# Patient Record
Sex: FEMALE | Race: BLACK OR AFRICAN AMERICAN
[De-identification: names, ages, dates, MRNs, and addresses within clinical notes are randomized per-mention and may not be internally consistent; named-entity substitution may affect disease eponyms.]

---

## 2017-08-16 ENCOUNTER — HOSPITAL ENCOUNTER (EMERGENCY)
Dept: HOSPITAL 62 - ER | Age: 32
Discharge: HOME | End: 2017-08-16
Payer: COMMERCIAL

## 2017-08-16 VITALS — DIASTOLIC BLOOD PRESSURE: 71 MMHG | SYSTOLIC BLOOD PRESSURE: 123 MMHG

## 2017-08-16 DIAGNOSIS — M79.1: ICD-10-CM

## 2017-08-16 DIAGNOSIS — Z87.01: ICD-10-CM

## 2017-08-16 DIAGNOSIS — J06.9: Primary | ICD-10-CM

## 2017-08-16 DIAGNOSIS — H92.03: ICD-10-CM

## 2017-08-16 DIAGNOSIS — Z87.891: ICD-10-CM

## 2017-08-16 PROCEDURE — 99282 EMERGENCY DEPT VISIT SF MDM: CPT

## 2017-08-16 NOTE — ER DOCUMENT REPORT
ED ENT





- General


Chief Complaint: Ear Pain


Stated Complaint: EAR PAIN,HEADACHE


Time Seen by Provider: 08/16/17 14:05


Mode of Arrival: Ambulatory


Information source: Patient


Notes: 





32-year-old female presents to ED for complaint of bilateral ear pain runny 

nose body aches and stuffiness.  Patient states that her ears have gradually 

progressed to the point they are, they have sore for 3 days, but it got worse 

yesterday after she was out in the right.  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 

increase.


TRAVEL OUTSIDE OF THE [place] IN LAST 30 DAYS: No





- HPI


Patient complains to provider of: Ear problem, Nose problem, Other - Body aches


Onset: Other - 3 days worse in the last day


Onset/Duration: Gradual, Worse


Quality of pain: Achy, Sharp


Severity: Moderate


Pain Level: 4


Context: Recent Illness


Location of pain: Ears, Nose, Sinus


Associated symptoms: Ear pain, Runny nose, Sinus pain, Sinus drainage, Other - 

Body aches


Similar symptoms previously: Yes


Recently seen / treated by doctor: No





- Related Data


Allergies/Adverse Reactions: 


 





No Known Allergies Allergy (Verified 08/16/17 13:52)


 











Past Medical History





- General


Information source: Patient





- Social History


Smoking Status: Former Smoker


Cigarette use (# per day): No


Chew tobacco use (# tins/day): No


Smoking Education Provided: No


Frequency of alcohol use: None


Drug Abuse: None


Occupation: Call center


Lives with: Family - Her and her children


Family History: Malignancy - Skin and breast cancer


Patient has suicidal ideation: No





- Past Medical History


Cardiac Medical History: Reports: None


Pulmonary Medical History: Reports: Hx Pneumonia


EENT Medical History: Reports: None


Neurological Medical History: Reports: None


Endocrine Medical History: Reports: None


Renal/ Medical History: Reports: None


Malignancy Medical History: Reports: None


GI Medical History: Reports: None


Musculoskeltal Medical History: Reports Hx Arthritis - back, Reports Hx 

Musculoskeletal Deformity - Herniated disc, Reports Hx Musculoskeletal Trauma


Skin Medical History: Reports None


Psychiatric Medical History: Reports: Hx Depression - Patient took herself off 

of her medication


Traumatic Medical History: Reports: None


Infectious Medical History: Reports: None


Past Surgical History: Reports: Hx Oral Surgery - [place] Teeth, Hx Orthopedic 

Surgery - ACL repair R knee, L ankle





- Immunizations


Immunizations up to date: Yes


Hx Diphtheria, Pertussis, Tetanus Vaccination: Yes





Review of Systems





- Review of Systems


Constitutional: Recent illness


EENT: Ear pain, Nose discharge, Sinus pressure


Cardiovascular: No symptoms reported


Respiratory: Cough


Gastrointestinal: No symptoms reported


Genitourinary: No symptoms reported


Female Genitourinary: No symptoms reported


Musculoskeletal: Muscle pain, Other - Body aches


Skin: No symptoms reported


Hematologic/Lymphatic: No symptoms reported


Neurological/Psychological: No symptoms reported


-: Yes All other systems reviewed and negative





Physical Exam





- Vital signs


Vitals: 


 











Temp Pulse Resp BP Pulse Ox


 


 98.6 F   84   16   123/71   100 


 


 08/16/17 13:50  08/16/17 13:50  08/16/17 13:50  08/16/17 13:50  08/16/17 13:50











Interpretation: Normal





- General


General appearance: Appears well, Alert





- HEENT


Head: Normocephalic, Atraumatic


Eyes: Normal


Pupils: PERRL


Ears: Normal


External canal: Normal


Tympanic membrane: Normal


Sinus: Frontal, Mastoid, Tenderness


Nasal: Purulent discharge, Swelling


Mucous membranes: Normal


Pharynx: Normal


Neck: Normal





- Respiratory


Respiratory status: No respiratory distress


Chest status: Nontender


Breath sounds: Normal


Chest palpation: Normal





- Cardiovascular


Rhythm: Regular


Heart sounds: Normal auscultation


Murmur: No





- Abdominal


Inspection: Normal


Distension: No distension


Bowel sounds: Normal


Tenderness: Nontender


Organomegaly: No organomegaly





- Back


Back: Normal, Nontender





- Extremities


General upper extremity: Normal inspection, Nontender, Normal color, Normal ROM

, Normal temperature


General lower extremity: Normal inspection, Nontender, Normal color, Normal ROM

, Normal temperature, Normal weight bearing.  No: Jane's sign





- Neurological


Neuro grossly intact: Yes


Cognition: Normal


Orientation: AAOx4


[place] Coma Scale Eye Opening: Spontaneous


[place] Coma Scale Verbal: Oriented


[place] Coma Scale Motor: Obeys Commands


[place] Coma Scale Total: 15


Speech: Normal


Motor strength normal: LUE, RUE, LLE, RLE


Sensory: Normal





- Psychological


Associated symptoms: Normal affect, Normal mood





- Skin


Skin Temperature: Warm


Skin Moisture: Dry


Skin Color: Normal





Course





- Re-evaluation


Re-evalutation: 





08/16/17 14:38


Assessment consistent with an upper respiratory infection with sinus pressure 

and pain.  Afebrile having body aches ear pain ears are normal looking.





- Vital Signs


Vital signs: 


 











Temp Pulse Resp BP Pulse Ox


 


 98.6 F   84   16   123/71   100 


 


 08/16/17 13:50  08/16/17 13:50  08/16/17 13:50  08/16/17 13:50  08/16/17 13:50














Discharge





- Discharge


Clinical Impression: 


 Otalgia of both ears





URI (upper respiratory infection)


Qualifiers:


 URI type: unspecified URI Qualified Code(s): J06.9 - Acute upper respiratory 

infection, unspecified





Condition: Stable


Disposition: HOME, SELF-CARE


Instructions:  Family Physicians / Practices, Use of Over-The-Counter Ibuprofen 

([facility])


Additional Instructions: 


UPPER RESPIRATORY ILLNESS:





     You have a viral infection of the respiratory passages -- a "cold."  This 

common infection causes nasal congestion, drainage, and often sore throat and 

cough.  It is highly contagious.  The disease usually lasts about 10 to 14 days.


     There is no "cure" for the viral infection -- it must run its course.  If 

there is a complication, such as bacterial infection in the nose, sinuses, 

middle ear, or bronchial tubes, antibiotics may be required.  The antibiotics 

won't affect the virus.


     Drink plenty of fluids.  A humidifier may help.  An expectorant medication 

or decongestant may make you more comfortable.  Use acetaminophen or ibuprofen 

for fever or aches.


     See the doctor if fever persists over two days, if there is any 

significant worsening of your symptoms, or if you simply fail to improve as 

expected.








DECONGESTANT MEDICATION:


     A decongestant medicine has been recommended.  Often this medicine is 

combined in the same tablet with an antihistamine or expectorant. This type of 

medicine is helpful in treating a bad cold or sinus condition, as well as in 

treatment of the nasal congestion of hay fever.  It is not of much benefit for 

lung infections.


     Decongestant medicines are related to stimulants.  They can cause an 

increase in blood pressure and heart rate.  Persons with heart disease and high 

blood pressure should not take decongestants without discussing this with the 

physician.


     If you develop palpitations, chest pain, headache, or tremors, stop the 

medicine and consult your physician.








COUGH-SUPPRESSANT & EXPECTORANT MEDICATION:


     You are to use a cough medication as needed for relief of symptoms.  This 

medicine is a combination of an expectorant (to make the mucous thinner and 

more easily "coughed up") and a cough suppressant (to reduce the frequency of 

coughing).


     The cough-suppressant medicine is related to narcotics.  You may 

experience mild nausea and sleepiness.  Some patients who are very sensitive to 

narcotics may have stomach pain from this medicine. Taking the medicine with 

food reduces these side effects.  Do not drive or work with machinery until you 

know how this medicine affects you.


     The expectorant should have no side effects.  Iodine-containing 

expectorants (such as organidin) should not be taken by persons with active 

thyroid disease unless approved by your doctor.


     Call the doctor if you develop shortness of breath, hives, rash, itching, 

lightheadedness, or severe nausea and vomiting.











USE OF ACETAMINOPHEN (Tylenol):


     Acetaminophen may be taken for pain relief or fever control. It's much 

safer than aspirin, offering a wider range of "safe" dosages.  It is safe 

during pregnancy.  Some brand names are Tylenol, Panadol, Datril, Anacin 3, 

Tempra, and Liquiprin. Acetaminophen can be repeated every four hours.  The 

following are maximum recommended dosages:


>89 pounds or adults          650 mg to 900 mg


Acetaminophen can be repeated every four hours.  Maximum dose not to exceed 

4000 mg a day.





FOLLOW-UP CARE:


If you have been referred to a physician for follow-up care, call the physician

s office for an appointment as you were instructed or within the next two days.

  If you experience worsening or a significant change in your symptoms, notify 

the physician immediately or return to the Emergency Department at any time for 

re-evaluation.





Forms:  Return to Work


Referrals: 


MAX GOLDSTEIN MD [Primary Care Provider] - Follow up as needed

## 2017-10-26 ENCOUNTER — HOSPITAL ENCOUNTER (EMERGENCY)
Dept: HOSPITAL 62 - ER | Age: 32
Discharge: HOME | End: 2017-10-26
Payer: COMMERCIAL

## 2017-10-26 VITALS — SYSTOLIC BLOOD PRESSURE: 122 MMHG | DIASTOLIC BLOOD PRESSURE: 78 MMHG

## 2017-10-26 DIAGNOSIS — K64.4: ICD-10-CM

## 2017-10-26 DIAGNOSIS — M54.42: Primary | ICD-10-CM

## 2017-10-26 DIAGNOSIS — Z87.891: ICD-10-CM

## 2017-10-26 DIAGNOSIS — G89.29: ICD-10-CM

## 2017-10-26 DIAGNOSIS — K64.8: ICD-10-CM

## 2017-10-26 PROCEDURE — 96372 THER/PROPH/DIAG INJ SC/IM: CPT

## 2017-10-26 PROCEDURE — 73502 X-RAY EXAM HIP UNI 2-3 VIEWS: CPT

## 2017-10-26 PROCEDURE — 99283 EMERGENCY DEPT VISIT LOW MDM: CPT

## 2017-10-26 NOTE — RADIOLOGY REPORT (SQ)
EXAM DESCRIPTION:  HIP LEFT AP/LATERAL



COMPLETED DATE/TIME:  10/26/2017 2:36 pm



REASON FOR STUDY:  pain radiating down to foot



COMPARISON:  None.



NUMBER OF VIEWS:  Two views.



TECHNIQUE:  AP pelvis and additional frog-leg view of the left hip.



LIMITATIONS:  None.



FINDINGS:  MINERALIZATION: Normal.

LEFT HIP: No fracture or dislocation.  No worrisome bone lesions.

RIGHT HIP: No fracture or dislocation.  No worrisome bone lesions.

PUBIS AND ISCHIUM: No fracture.

PELVIS: No fracture.

SACRUM: No fracture or dislocation. No worrisome bone lesions.

LOWER LUMBAR SPINE: No fracture or dislocation. No worrisome bone lesions.  No significant disc disea
se.

SOFT TISSUES: No findings.

OTHER: IUD is projected in the mid pelvis.



IMPRESSION:  NEGATIVE STUDY OF THE LEFT HIP AND PELVIS. NO RADIOGRAPHIC EVIDENCE OF ACUTE INJURY.



TECHNICAL DOCUMENTATION:  JOB ID:  [id number]

 2011 [employer]- All Rights Reserved

## 2017-10-26 NOTE — ER DOCUMENT REPORT
ED Extremity Problem, Lower





- General


Chief Complaint: Leg Pain


Stated Complaint: LEG PAIN


Time Seen by Provider: 10/26/17 13:01


Mode of Arrival: Ambulatory


Information source: Patient


Notes: 





32-year-old female presents to ED for complaint of low back pain radiating to 

her left leg and foot.  She states she seen her PCP last week and was given 

Robaxin and Vicoprofen with no relief.  She states she also has a cyst are not 

beside her rectum that is getting worse and more painful.  She states she has 

had hemorrhoids in the past and this feels like a hemorrhoid.


TRAVEL OUTSIDE OF THE [place] IN LAST 30 DAYS: No





- HPI


Patient complains to provider of: Pain.  No: Injury


Location: Back, Buttock, Hip


Occurred: Other - Back pain radiating to the leg is actually chronic hemorrhoid 

is been for several days


Onset/Duration: Worse


Quality of pain: Pressure, Sharp


Severity: Severe


Pain Level: 5


Recent injury: No


Associated symptoms: Painful ambulation


Exacerbated by: Movement, Walking, Other - Sitting


Relieved by: Nothing





- Related Data


Allergies/Adverse Reactions: 


 





No Known Allergies Allergy (Verified 10/26/17 12:18)


 











Past Medical History





- General


Information source: Patient





- Social History


Smoking Status: Former Smoker


Cigarette use (# per day): No


Chew tobacco use (# tins/day): No


Smoking Education Provided: No


Frequency of alcohol use: Rare


Drug Abuse: None


Lives with: Family


Family History: Malignancy - Skin and breast cancer


Patient has suicidal ideation: No


Patient has homicidal ideation: No





- Past Medical History


Cardiac Medical History: Reports: None


Pulmonary Medical History: Reports: Hx Pneumonia


EENT Medical History: Reports: None


Neurological Medical History: Reports: None


Endocrine Medical History: Reports: None


Renal/ Medical History: Reports: None


Malignancy Medical History: Reports: None


GI Medical History: Reports: None


Musculoskeltal Medical History: Reports Hx Arthritis - back, Reports Hx 

Musculoskeletal Deformity - Herniated disc, Reports Hx Musculoskeletal Trauma


Skin Medical History: Reports None


Psychiatric Medical History: Reports: Hx Depression - Patient took herself off 

of her medication


Traumatic Medical History: Reports: None


Infectious Medical History: Reports: None


Past Surgical History: Reports: Hx Oral Surgery - [place] Teeth, Hx Orthopedic 

Surgery - ACL repair R knee, L ankle





- Immunizations


Immunizations up to date: Yes


Hx Diphtheria, Pertussis, Tetanus Vaccination: Yes





Review of Systems





- Review of Systems


Constitutional: No symptoms reported


EENT: No symptoms reported


Cardiovascular: No symptoms reported


Respiratory: No symptoms reported


Gastrointestinal: No symptoms reported.  denies: Constipation, Fecal 

incontinence


Genitourinary: No symptoms reported.  denies: Incontinence, Retention


Female Genitourinary: No symptoms reported


Musculoskeletal: Back pain, Muscle pain


Skin: No symptoms reported


Hematologic/Lymphatic: Other - Hemorrhoid


Neurological/Psychological: No symptoms reported


-: Yes All other systems reviewed and negative





Physical Exam





- Vital signs


Vitals: 


 











Temp Pulse Resp BP Pulse Ox


 


 99.4 F   90   16   139/84 H  100 


 


 10/26/17 12:19  10/26/17 12:19  10/26/17 12:19  10/26/17 12:19  10/26/17 12:19











Interpretation: Normal





- General


General appearance: Appears well, Alert





- HEENT


Head: Normocephalic, Atraumatic


Eyes: Normal


Pupils: PERRL





- Respiratory


Respiratory status: No respiratory distress


Chest status: Nontender


Breath sounds: Normal


Chest palpation: Normal





- Cardiovascular


Rhythm: Regular


Heart sounds: Normal auscultation


Murmur: No





- Abdominal


Inspection: Normal


Distension: No distension


Bowel sounds: Normal


Tenderness: Nontender


Organomegaly: No organomegaly





- Rectal


Tenderness: Yes


Hemorrhoids: Internal, External


Notes: 





3 total hemorrhaged 1 external to internal, will start patient on Anusol.





- Back


Back: Normal, Tender.  No: Deformity/step-off, CVA tenderness, Vertebra 

tenderness, Scars, Scoliosis, Wounds





- Extremities


General upper extremity: Normal inspection, Nontender, Normal color, Normal ROM

, Normal temperature


General lower extremity: Normal inspection, Nontender, Normal color, Normal ROM

, Normal temperature, Normal weight bearing.  No: Jane's sign





- Neurological


Neuro grossly intact: Yes


Cognition: Normal


Orientation: AAOx4


[place] Coma Scale Eye Opening: Spontaneous


[place] Coma Scale Verbal: Oriented


[place] Coma Scale Motor: Obeys Commands


[place] Coma Scale Total: 15


Speech: Normal


Cranial nerves: Normal


Cerebellar coordination: Normal


Motor strength normal: LUE, RUE, LLE, RLE


Additional motor exam normals: Equal 


Babinski reflex: Normal (flexor plantar)


Sensory: Normal





- Psychological


Associated symptoms: Normal affect, Normal mood





- Skin


Skin Temperature: Warm


Skin Moisture: Dry


Skin Color: Normal





Course





- Re-evaluation


Re-evalutation: 





10/26/17 15:21


Patient denies any signs or symptoms of cauda equina, no saddle anesthesia, no 

loss of sensation, no loss of control of bowel bladder, no loss of muscle 

control, and has not fallen or injured herself.  Patient has a history of 

chronic back pain with sciatica.  She has been to see a back specialist in the 

past states she has not been in a while.  She was seen her by her PCP last week 

and given Vicoprofen and Robaxin.  She states she has not fallen.  She does 

have hemorrhoids which she will be treated with suppositories for.  I have 

changed her muscle relaxer from Robaxin to Flexeril.  Patient instructed to 

follow-up with her primary doctor and her back specialist for her continued 

pain.  I explained to the patient the policy for the emergency room for chronic 

pain with no new injuries.





- Vital Signs


Vital signs: 


 











Temp Pulse Resp BP Pulse Ox


 


 99.4 F   89   16   122/78   98 


 


 10/26/17 12:19  10/26/17 15:18  10/26/17 15:18  10/26/17 15:18  10/26/17 15:18














- Diagnostic Test


Radiology reviewed: Image reviewed, Reports reviewed





Discharge





- Discharge


Clinical Impression: 


 Acute hemorrhoid





Low back pain


Qualifiers:


 Chronicity: unspecified Back pain laterality: left Sciatica presence: with 

sciatica Sciatica laterality: sciatica of left side Qualified Code(s): M54.42 - 

Lumbago with sciatica, left side





Condition: Stable


Disposition: HOME, SELF-CARE


Additional Instructions: 


LOW BACK PAIN:





     Three out of every four people will have an episode of disabling back pain 

during their lifetime.  Most commonly the pain is due to straining of the 

muscles and ligaments in the low back.


     Usual treatment includes: 


(1) Rest on a firm surface.  Avoid lying on your stomach.  


(2) Ice pack the painful area.  After a few days, gentle heat may be used 

intermittently to relax the area, or ice packs can be continued.  


(3) Medication may be needed -- muscle relaxers and antiinflammatory medicines 

are commonly used.  


(4) As the back improves, exercises are prescribed to strengthen the back and 

abdominal muscles.


     Your doctor will advise you on the proper care for your back at each stage 

in your recovery.  You may be better in a few days -- or healing may take 

several weeks.


     If new symptoms of a "herniated disc" (radiation of pain, numbness, or 

tingling down the back of the leg or weakness in the leg) occur, you should be 

re-examined.  Further testing may be necessary.





Sciatica





     Your symptoms suggest "sciatica."  The pain of sciatica typically radiates 

down the leg.  Numbness in the foot or calf may also occur. Sciatica is caused 

by irritation of the sciatic nerve or its branches. The irritation can be due 

to a herniated disk in the spine, swelling and inflammation in the muscles 

surrounding the sciatic nerve, or direct injury of the nerve itself.


     Most cases of sciatica will resolve with medical treatment. Bed rest is 

usually recommended initially.  Surgery is only necessary when the condition 

will not improve with rest and antiinflammatory medication.  Muscle relaxers 

are often given if muscle soreness is present.


     A CAT scan of the back may be performed if a herniated disk is suspected.


     Re-examination is necessary if you develop increasing numbness, localized 

weakness in the foot or ankle, or if the pain does not respond to rest.





Hemorrhoids





     You have hemorrhoids.  These are formed by enlargement of veins around the 

anus.  The cause is increased pressure in the veins, from pregnancy or 

straining at bowel movements.  Hemorrhoids often cause itching and bleeding 

with bowel movements.  When a hemorrhoid becomes clotted, severe pain and 

swelling result.


     Soothing creams and suppositories are often prescribed.  Warm sitz-baths 

may also decrease pain, swelling, and itching.  Eat a high-fiber diet.  Stool 

softeners such as Metamucil will help.


     Keep the area very clean.  Medicated cleansing pads (such as Tucks) are 

useful after bowel movements.  A hose-mounted shower unit (like a shower 

massager at low water pressure) can be used to clean around tender hemorrhoid 

tags.


     You should call the doctor or return if you develop fever, increasing pain

, or an enlarging mass around the anus, or if you simply fail to improve with 

treatment.





Toradol Injection





     You have been given an injection of ketorolac tromethamine (Toradol).  

This is an excellent, safe drug for pain control.  It also has potent 

antiinflammatory action.  You should have significant pain relief within about 

one hour.


     Toradol is not addicting and is non-sedating.  It does not interfere with 

driving or work.


     Call or return if you develop itching, hives, shortness of breath, or rash.





STEROID MEDICATION:





     You have been given an injection of medicine of the cortisone/steroid 

class.  This medication is used to control inflammation or allergy.  It is 

often continued as a pill for a short period of time, until the acute process 

subsides.


     There are usually no side effects from short-term use of cortisone-like 

medications.  Some persons feel an increased sense of well-being and are not 

sleepy at bedtime.  Long-term use of cortisone medications is best avoided, 

unless required for a severe condition.  If your condition does not remit, or 

relapses after the course of corticosteroid medication, you should consult your 

physician.








MUSCLE RELAXERS: 


     Muscle relaxing medications are usually prescribed for acute muscle spasm 

or injury to the neck and back.  They are often combined with antiinflammatory 

pain medication for increased relief.


     You may stop the muscle relaxer when the pain and stiffness have improved.

  Start the medication again if spasms recur.  


     Muscle relaxers may cause drowsiness, especially with the first dose.  Do 

not operate machinery or drive while under the effects of the medication.  Most 

muscle relaxers last up to 24 hours.  Do not combine the medication with 

alcohol.








ICE PACKS:


     Apply ice packs frequently against the painful area.  Many different 

schedules are recommended, such as "20 minutes on, 20 minutes off" or "one hour 

ice, two hours rest."  If you need to work, you may need to go longer between 

ice treatments.  You should plan to have the area ice packed AT LEAST one 

fourth of the time.


     The ice should be applied over the wrap, tape, or splint, or over a layer 

of cloth -- not directly against the skin.  Some ice bags have a built-in cloth 

and can be put directly on the skin.





WARM PACKS:


     After approximately two days, apply gentle heat (such as a heating pad or 

hot water bottle) for about 20 to 30 minutes about every two hours -- at least 

four times daily.  Warmth and elevation will help you make a more rapid recovery

, and will ease the pain considerably.


     Do not use HOT heat, and never apply heat for longer than 30 minutes.  The 

continuous heat can invisibly damage skin and muscles -- even when no burn is 

seen on the surface.  Damaged muscles can make you MORE sore.








FOLLOW-UP CARE:


If you have been referred to a physician for follow-up care, call the physician

s office for an appointment as you were instructed or within the next two days.

  If you experience worsening or a significant change in your symptoms, notify 

the physician immediately or return to the Emergency Department at any time for 

re-evaluation.


Prescriptions: 


Hydrocortisone Acetate 25 mg RC Q6HP PRN #10 supp.rect


 PRN Reason: 


Cyclobenzaprine HCl [Flexeril 10 mg Tablet] 10 mg PO TIDP PRN #15 tab


 PRN Reason: 


Forms:  Elevated Blood Pressure, Return to Work


Referrals: 


CAROL JO MD [ASSOCIATE] - Follow up as needed


DEAN MENDEZ MD [ACTIVE STAFF] - Follow up as needed

## 2017-10-27 ENCOUNTER — HOSPITAL ENCOUNTER (OUTPATIENT)
Dept: HOSPITAL 62 - RAD | Age: 32
End: 2017-10-27
Attending: INTERNAL MEDICINE
Payer: COMMERCIAL

## 2017-10-27 DIAGNOSIS — M54.16: Primary | ICD-10-CM

## 2017-10-27 PROCEDURE — 72158 MRI LUMBAR SPINE W/O & W/DYE: CPT

## 2017-10-27 PROCEDURE — A9577 INJ MULTIHANCE: HCPCS

## 2017-10-27 NOTE — RADIOLOGY REPORT (SQ)
EXAM DESCRIPTION:  MRI LUMBAR SPINE COMBO



COMPLETED DATE/TIME:  10/27/2017 6:21 pm



REASON FOR STUDY:  Radiculopathy, lumbar region M54.16  RADICULOPATHY, LUMBAR REGION



COMPARISON:  Correlation made to radiographs from 5/4/2016



TECHNIQUE:  Sagittal and Axial imaging includes T1, T1 post gadolinium, T2, STIR and gradient echo se
quences. Coronal T2/HASTE imaging.



CONTRAST TYPE AND DOSE:  20 mL mL Multihance.



RENAL FUNCTION:  None required. The patient is less than 50 years old.



LIMITATIONS:  None.



FINDINGS:  VISUALIZED UPPER ABDOMEN:  Limited evaluation. No acute or suspicious findings suggested.

SEGMENTATION: No transitional anatomy. The lowest well-developed disc space is labeled L5-S1.

ALIGNMENT: Anatomic.  Stable degree of levoscoliosis.

VERTEBRAE: Intact.  No fractures.

BONE MARROW: Benign intraosseous hemangioma within the L3 vertebral body.  Marrow signal otherwise no
rmal without fracture or suspicious osseous lesion.

DISC SIGNAL: Disc desiccation L4-L5 and L5-S1.

POSTERIOR ELEMENTS:  Status post L4 laminectomy.  Otherwise intact.  No pars defect evident.

HARDWARE: None in the spine.

CORD AND CONUS: Normal in size and signal intensity. Conus at the appropriate level.

SOFT TISSUES: No aortic aneurysm seen. No bulky retroperitoneal adenopathy or mass. No paraspinal mas
s or fluid.

L1-L2: No significant spinal stenosis or exit foraminal stenosis.

L2-L3: No significant spinal stenosis or exit foraminal stenosis.

L3-L4: Mild broad-based disc bulging without significant spinal canal stenosis or neural foraminal na
rrowing.

L4-L5: Broad-based disc bulging eccentric to the left which results in mild neural foraminal narrowin
g and probably contacts the transiting L5 nerve root within the subarticular recess.  No significant 
spinal canal stenosis or right neural foraminal narrowing.

L5-S1: Broad-based disc bulging with small superimposed central protrusion along with ligamentum flav
um hypertrophy and facet arthropathy which results in mild bilateral neural foraminal narrowing and m
ild spinal canal stenosis.  No definite nerve root impingement.

LOWER THORACIC: Incompletely imaged.  No stenosis seen.

SACRUM: Visualized upper sacrum intact.

ENHANCEMENT: No abnormal enhancement.

OTHER: No other significant findings.



IMPRESSION:  DEGENERATIVE CHANGE MOST NOTABLE AT L4-L5 AND L5-S1 WITH PROBABLE CONTACT OF THE LEFT L5
 NERVE ROOT WITHIN THE SUBARTICULAR RECESS.  CORRELATE WITH RADICULOPATHY AT THIS LEVEL.

NO HIGH-GRADE SPINAL CANAL STENOSIS IDENTIFIED.



TECHNICAL DOCUMENTATION:  JOB ID:  [id number]

 2011 [employer]- All Rights Reserved

## 2017-12-23 ENCOUNTER — HOSPITAL ENCOUNTER (OUTPATIENT)
Dept: HOSPITAL 62 - LAB | Age: 32
End: 2017-12-23
Attending: INTERNAL MEDICINE
Payer: COMMERCIAL

## 2017-12-23 DIAGNOSIS — Z01.812: Primary | ICD-10-CM

## 2017-12-23 LAB
ANION GAP SERPL CALC-SCNC: 7 MMOL/L (ref 5–19)
APTT BLD: 30.9 SEC (ref 23.5–35.8)
BASOPHILS # BLD AUTO: 0.1 10^3/UL (ref 0–0.2)
BASOPHILS NFR BLD AUTO: 0.5 % (ref 0–2)
BUN SERPL-MCNC: 10 MG/DL (ref 7–20)
CALCIUM: 8.8 MG/DL (ref 8.4–10.2)
CHLORIDE SERPL-SCNC: 104 MMOL/L (ref 98–107)
CO2 SERPL-SCNC: 29 MMOL/L (ref 22–30)
CREAT SERPL-MCNC: 0.73 MG/DL (ref 0.52–1.25)
EOSINOPHIL # BLD AUTO: 0.3 10^3/UL (ref 0–0.6)
EOSINOPHIL NFR BLD AUTO: 3.1 % (ref 0–6)
ERYTHROCYTE [DISTWIDTH] IN BLOOD BY AUTOMATED COUNT: 14.5 % (ref 11.5–14)
GLUCOSE SERPL-MCNC: 88 MG/DL (ref 75–110)
HCT VFR BLD CALC: 39.7 % (ref 36–47)
HGB BLD-MCNC: 13.1 G/DL (ref 12–15.5)
HGB HCT DIFFERENCE: -0.4
LYMPHOCYTES # BLD AUTO: 2.8 10^3/UL (ref 0.5–4.7)
LYMPHOCYTES NFR BLD AUTO: 27.9 % (ref 13–45)
MCH RBC QN AUTO: 27.1 PG (ref 27–33.4)
MCHC RBC AUTO-ENTMCNC: 33 G/DL (ref 32–36)
MCV RBC AUTO: 82 FL (ref 80–97)
MONOCYTES # BLD AUTO: 0.6 10^3/UL (ref 0.1–1.4)
MONOCYTES NFR BLD AUTO: 6.4 % (ref 3–13)
NEUTROPHILS # BLD AUTO: 6.2 10^3/UL (ref 1.7–8.2)
NEUTS SEG NFR BLD AUTO: 62.1 % (ref 42–78)
POTASSIUM SERPL-SCNC: 4.2 MMOL/L (ref 3.6–5)
PROTHROMBIN TIME: 12.7 SEC (ref 11.4–15.4)
RBC # BLD AUTO: 4.84 10^6/UL (ref 3.72–5.28)
SODIUM SERPL-SCNC: 139.6 MMOL/L (ref 137–145)
WBC # BLD AUTO: 10 10^3/UL (ref 4–10.5)

## 2017-12-23 PROCEDURE — 80048 BASIC METABOLIC PNL TOTAL CA: CPT

## 2017-12-23 PROCEDURE — 85730 THROMBOPLASTIN TIME PARTIAL: CPT

## 2017-12-23 PROCEDURE — 36415 COLL VENOUS BLD VENIPUNCTURE: CPT

## 2017-12-23 PROCEDURE — 85025 COMPLETE CBC W/AUTO DIFF WBC: CPT

## 2017-12-23 PROCEDURE — 85610 PROTHROMBIN TIME: CPT

## 2018-03-01 ENCOUNTER — HOSPITAL ENCOUNTER (EMERGENCY)
Dept: HOSPITAL 62 - ER | Age: 33
Discharge: HOME | End: 2018-03-01
Payer: MEDICAID

## 2018-03-01 VITALS — SYSTOLIC BLOOD PRESSURE: 125 MMHG | DIASTOLIC BLOOD PRESSURE: 69 MMHG

## 2018-03-01 DIAGNOSIS — J02.8: Primary | ICD-10-CM

## 2018-03-01 DIAGNOSIS — B97.89: ICD-10-CM

## 2018-03-01 DIAGNOSIS — R51: ICD-10-CM

## 2018-03-01 DIAGNOSIS — M54.5: ICD-10-CM

## 2018-03-01 DIAGNOSIS — H92.09: ICD-10-CM

## 2018-03-01 LAB
A TYPE INFLUENZA AG: NEGATIVE
B INFLUENZA AG: NEGATIVE

## 2018-03-01 PROCEDURE — 99283 EMERGENCY DEPT VISIT LOW MDM: CPT

## 2018-03-01 PROCEDURE — 87804 INFLUENZA ASSAY W/OPTIC: CPT

## 2018-03-01 PROCEDURE — 87880 STREP A ASSAY W/OPTIC: CPT

## 2018-03-01 PROCEDURE — 87077 CULTURE AEROBIC IDENTIFY: CPT

## 2018-03-01 PROCEDURE — 87070 CULTURE OTHR SPECIMN AEROBIC: CPT

## 2018-03-01 NOTE — ER DOCUMENT REPORT
ED General





- General


Chief Complaint: Sore Throat


Stated Complaint: BODY ACHES, EAR/THROAT PAIN


Time Seen by Provider: 03/01/18 09:20


Mode of Arrival: Ambulatory


Information source: Patient


Notes: 





Patient is a 31 yo FM who presents with 2 day history of sore throat, ear pain, 

body aches, headache. She does endorse fever last night (Tmax 101.6F) and took 

tylenol cold and flu and went to sleep. Denies any fever this morning, neck pain

/stiffness, cough, abdominal pain, vomiting. diarrhea. She has not gotten the 

flu shot and denies sick contacts. 


TRAVEL OUTSIDE OF THE [place] IN LAST 30 DAYS: No





- Related Data


Allergies/Adverse Reactions: 


 





No Known Allergies Allergy (Verified 03/01/18 08:22)


 











Past Medical History





- General


Information source: Patient





- Social History


Smoking Status: Never Smoker


Frequency of alcohol use: None


Drug Abuse: None


Family History: Malignancy - Skin and breast cancer


Patient has suicidal ideation: No


Patient has homicidal ideation: No





- Past Medical History


Cardiac Medical History: 


   Denies: Hx Coronary Artery Disease, Hx Heart Attack, Hx Hypertension


Pulmonary Medical History: Reports: Hx Pneumonia


   Denies: Hx Asthma, Hx Bronchitis, Hx COPD


Neurological Medical History: Denies: Hx Cerebrovascular Accident, Hx Seizures


Renal/ Medical History: Denies: Hx Peritoneal Dialysis


Musculoskeltal Medical History: Reports Hx Arthritis - back, Reports Hx 

Musculoskeletal Deformity - Herniated disc, Reports Hx Musculoskeletal Trauma


Psychiatric Medical History: Reports: Hx Depression - Patient took herself off 

of her medication


Past Surgical History: Reports: Hx Oral Surgery - [place] Teeth, Hx Orthopedic 

Surgery - ACL repair R knee, L ankle





- Immunizations


Immunizations up to date: Yes


Hx Diphtheria, Pertussis, Tetanus Vaccination: Yes





Review of Systems





- Review of Systems


Constitutional: See HPI


EENT: See HPI


Cardiovascular: No symptoms reported


Respiratory: See HPI


Gastrointestinal: No symptoms reported


Genitourinary: No symptoms reported


Female Genitourinary: No symptoms reported


Musculoskeletal: No symptoms reported


Skin: No symptoms reported


Hematologic/Lymphatic: No symptoms reported


Neurological/Psychological: No symptoms reported





Physical Exam





- Vital signs


Vitals: 


 











Temp Pulse Resp BP Pulse Ox


 


 98.5 F   97   16   131/73 H  98 


 


 03/01/18 08:25  03/01/18 08:25  03/01/18 08:25  03/01/18 08:25  03/01/18 08:25














- Notes


Notes: 





PHYSICAL EXAM:


CONSTITUTIONAL: Alert and oriented, well-appearing and in no acute distress. 


HENT: Normocephalic, atraumatic. Ear canals  erythematous b/l without  foreign 

body, TMs  dull with good bony landmarks. Nares clear without erythema, septal 

hematoma or deviation, airway patent. Oropharynx erythematous without tonsilar 

exudate or malocclusion. Trachea midline. Uvula midline. Moist mucous membranes.


EYES: Pupils equal round and reactive to light, EOM intact. Sclera anicteric, 

conjunctiva are normal. No entrapment. 


NECK: supple without lymphadenopathy. No midline tenderness or paraspinous 

muscle spasms. No step-offs or deformities. ROM intact. 


HEART: Regular rate and rhythm without murmurs. 


LUNGS: CTAB and equal. No wheezes, rales or rhonchi. 


GI: Normactive bowel sounds. Nontender, non-distended. No organomegaly. no 

CVAT. 


EXTREMITIES: no bony tenderness, erythema, edema, ecchymosis or deformity. 

Normal range of motion, no pitting edema. No cyanosis. Cap Refill <3 seconds.


NEURO: Cranial nerves grossly intact. Normal sensory/motor exams.


PSYCH: Normal mood, normal affect. 


SKIN: Warm and dry. Normal turgor. No rashes or lesions noted.





Course





- Re-evaluation


Re-evalutation: 





03/01/18 09:28


Patient seen and examined. VSS, well hydrated, well appearing, non-toxic. 

Speaking in full sentences without difficulty. Based on exam, low suspicion for 

peritonsillar abscess, meningitis, sepsis or other emergent condition. Will 

obtain flu/strep swab. 








03/01/18 10:29


Rapid flu and strep negative, throat culture pending. Consistent with viral 

pharyngitis/URI. Will give supportive treatments and work note.  At this time, 

will discharge with return precautions and follow-up recommendations. Verbal 

discharge instructions given at the bedside and opportunity for questions 

given. Medication warnings reviewed. Patient is in agreement with this plan and 

has verbalized understanding of return precautions and the need for primary 

care follow-up in the next 24-72 hours. 








- Vital Signs


Vital signs: 


 











Temp Pulse Resp BP Pulse Ox


 


 98.7 F   85   16   125/69   100 


 


 03/01/18 10:25  03/01/18 10:25  03/01/18 10:25  03/01/18 10:25  03/01/18 10:25














Discharge





- Discharge


Clinical Impression: 


 Viral pharyngitis, Viral URI





Low back pain


Qualifiers:


 Chronicity: acute Back pain laterality: bilateral Sciatica presence: without 

sciatica Qualified Code(s): M54.5 - Low back pain





Condition: Stable


Disposition: HOME, SELF-CARE


Additional Instructions: 


UPPER RESPIRATORY ILLNESS:





     You have a viral infection of the respiratory passages -- a "cold."  This 

common infection causes nasal congestion, drainage, and often sore throat and 

cough.  It is highly contagious.  The disease usually lasts about 10 to 14 days.


     There is no "cure" for the viral infection -- it must run its course.  If 

there is a complication, such as bacterial infection in the nose, sinuses, 

middle ear, or bronchial tubes, antibiotics may be required.  The antibiotics 

won't affect the virus.


     Drink plenty of fluids.  A humidifier may help.  An expectorant medication 

or decongestant may make you more comfortable.  Use acetaminophen or ibuprofen 

for fever or aches.


     See the doctor if fever persists over two days, if there is any 

significant worsening of your symptoms, or if you simply fail to improve as 

expected.








BRONCHOSPASM:





     You have tightness in the bronchial tubes, called bronchospasm. This often 

occurs with bronchial infections.  Allergies, inhaled chemicals, and polluted 

or cold air can also provoke bronchospasm. It's more likely in patients with 

asthma in the family.


     Emergency treatment of bronchospasm may include adrenaline shots or 

bronchodilator aerosol.  You may feel lightheaded and have a rapid pulse for an 

hour or two.  Rest and get plenty of fluids.


     At home, we'll treat you with a bronchodilator inhaler. Antibiotics and 

corticosteroids may be required for some patients. Until you recover, avoid 

chemical fumes, dusts, pollens, and exercising in very cold or dry air. If you 

smoke, stop now!!


     If you develop a fever, increased wheezing, chest pain, or severe 

shortness of breath, you should contact the doctor immediately.








DECONGESTANT MEDICATION:


     A decongestant medicine has been prescribed.  Often this medicine is 

combined in the same tablet with an antihistamine or expectorant. This type of 

medicine is helpful in treating a bad cold or sinus condition, as well as in 

treatment of the nasal congestion of hay fever.  It is not of much benefit for 

lung infections.


     Decongestant medicines are related to stimulants.  They can cause an 

increase in blood pressure and heart rate.  Persons with heart disease and high 

blood pressure should not take decongestants without discussing this with the 

physician.


     If you develop palpitations, chest pain, headache, or tremors, stop the 

medicine and consult your physician.








COUGH-SUPPRESSANT & EXPECTORANT MEDICATION:


     You are to use a cough medication as needed for relief of symptoms.  This 

medicine is a combination of an expectorant (to make the mucous thinner and 

more easily "coughed up") and a cough suppressant (to reduce the frequency of 

coughing).


     The cough-suppressant medicine is related to narcotics.  You may 

experience mild nausea and sleepiness.  Some patients who are very sensitive to 

narcotics may have stomach pain from this medicine. Taking the medicine with 

food reduces these side effects.  Do not drive or work with machinery until you 

know how this medicine affects you.


     The expectorant should have no side effects.  Iodine-containing 

expectorants (such as organidin) should not be taken by persons with active 

thyroid disease unless approved by your doctor.


     Call the doctor if you develop shortness of breath, hives, rash, itching, 

lightheadedness, or severe nausea and vomiting.








USE OF ACETAMINOPHEN (Tylenol):


     Acetaminophen may be taken for pain relief or fever control. It's much 

safer than aspirin, offering a wider range of "safe" dosages.  It is safe 

during pregnancy.  Some brand names are Tylenol, Panadol, Datril, Anacin 3, 

Tempra, and Liquiprin. Acetaminophen can be repeated every four hours.  The 

following are maximum recommended dosages:


>89 pounds or adults          650 mg to 900 mg


Acetaminophen can be repeated every four hours.  Maximum dose not to exceed 

4000 mg a day.








SMOKING:


     If you smoke, you should stop smoking.  The tar and chemicals in cigarette 

smoke are harmful.  Smoking has been shown to cause:


          emphysema


          chronic bronchitis


          lung cancer


          mouth and throat cancer


          stomach and pancreas cancer


          premature aging


          birth defects


     In addition, smoking increases ear and lung infections in children of 

smokers.








FOLLOW-UP CARE:


If you have been referred to a physician for follow-up care, call the physician

s office for an appointment as you were instructed or within the next two days.

  If you experience worsening or a significant change in your symptoms, notify 

the physician immediately or return to the Emergency Department at any time for 

re-evaluation.





Prescriptions: 


Pseudoephedrine HCl [Sudafed] 30 mg PO Q6HP PRN #8 tablet


 PRN Reason: 


Benzonatate [Tessalon Perles 100 mg Capsule] 100 mg PO Q8HP PRN #20 capsule


 PRN Reason: 


Albuterol Sulfate [Proair HFA Inhalation Aerosol 8.5 gm MDI] 2 puff IH Q4H PRN #

1 mdi


 PRN Reason: 


Naproxen [Naprosyn 250 mg Tablet] 500 mg PO BID #20 tablet


Forms:  Elevated Blood Pressure, Return to Work


Referrals: 


DEAN MENDEZ MD [Primary Care Provider] - Follow up in 3-5 days

## 2018-04-25 ENCOUNTER — HOSPITAL ENCOUNTER (EMERGENCY)
Dept: HOSPITAL 62 - ER | Age: 33
Discharge: HOME | End: 2018-04-25
Payer: COMMERCIAL

## 2018-04-25 VITALS — DIASTOLIC BLOOD PRESSURE: 82 MMHG | SYSTOLIC BLOOD PRESSURE: 132 MMHG

## 2018-04-25 DIAGNOSIS — Z98.890: ICD-10-CM

## 2018-04-25 DIAGNOSIS — M54.42: Primary | ICD-10-CM

## 2018-04-25 PROCEDURE — 99283 EMERGENCY DEPT VISIT LOW MDM: CPT

## 2018-04-25 PROCEDURE — 96372 THER/PROPH/DIAG INJ SC/IM: CPT

## 2018-04-25 NOTE — ER DOCUMENT REPORT
ED Neck/Back Problem





- General


Chief Complaint: Back Pain


Stated Complaint: LOW BACK PAIN


Time Seen by Provider: 04/25/18 13:38


Notes: 





Patient is a healthy 32-year-old female complaining of low back pain radiating 

into left buttock and left posterior thigh 3 days.  Patient does have a 

history of disc herniation with surgery performed this past December.  Patient 

has recently been on crutches due to a foot fracture.  Patient denies any fever

, paresthesia, bowel or bladder incontinence.


TRAVEL OUTSIDE OF THE [place] IN LAST 30 DAYS: No





- HPI


Onset: Sudden


Timing: Still present


Quality of pain: Achy, Burning


Associated symptoms: None


Exacerbated by: Movement of trunk, Sitting position


Relieved by: Nothing


Similar symptoms previously: Yes


Recently seen / treated by doctor: No





- Related Data


Allergies/Adverse Reactions: 


 





No Known Allergies Allergy (Verified 04/25/18 12:12)


 











Past Medical History





- General


Information source: Patient





- Social History


Smoking Status: Never Smoker


Chew tobacco use (# tins/day): No


Frequency of alcohol use: None


Drug Abuse: None


Lives with: Family


Family History: Malignancy - Skin and breast cancer


Patient has suicidal ideation: No


Patient has homicidal ideation: No





- Past Medical History


Cardiac Medical History: 


   Denies: Hx Coronary Artery Disease, Hx Heart Attack, Hx Hypertension


Pulmonary Medical History: Reports: Hx Pneumonia


   Denies: Hx Asthma, Hx Bronchitis, Hx COPD


Neurological Medical History: Denies: Hx Cerebrovascular Accident, Hx Seizures


Renal/ Medical History: Denies: Hx Peritoneal Dialysis


Musculoskeltal Medical History: Reports Hx Arthritis - back, Reports Hx 

Musculoskeletal Deformity - Herniated disc, Reports Hx Musculoskeletal Trauma


Psychiatric Medical History: Reports: Hx Depression - Patient took herself off 

of her medication


Past Surgical History: Reports: Hx Oral Surgery - [place] Teeth, Hx Orthopedic 

Surgery - ACL repair R knee, L ankle, 2 back





- Immunizations


Immunizations up to date: Yes


Hx Diphtheria, Pertussis, Tetanus Vaccination: Yes





Review of Systems





- Review of Systems


Constitutional: No symptoms reported


EENT: No symptoms reported


Cardiovascular: No symptoms reported


Respiratory: No symptoms reported


Gastrointestinal: No symptoms reported


Genitourinary: No symptoms reported


Female Genitourinary: No symptoms reported


Musculoskeletal: See HPI


Skin: No symptoms reported


Hematologic/Lymphatic: No symptoms reported


Neurological/Psychological: No symptoms reported





Physical Exam





- Vital signs


Vitals: 


 











Temp Pulse Resp BP Pulse Ox


 


 99.0 F   84   17   131/71 H  99 


 


 04/25/18 12:13  04/25/18 12:13  04/25/18 12:13  04/25/18 12:13  04/25/18 12:13











Interpretation: Normal





- General


General appearance: Appears well, Alert





- HEENT


Head: Normocephalic, Atraumatic


Eyes: Normal


Pupils: PERRL





- Respiratory


Respiratory status: No respiratory distress


Chest status: Nontender


Breath sounds: Normal


Chest palpation: Normal





- Cardiovascular


Rhythm: Regular


Heart sounds: Normal auscultation


Murmur: No





- Abdominal


Inspection: Normal


Distension: No distension


Bowel sounds: Normal


Tenderness: Nontender


Organomegaly: No organomegaly





- Back


Back: Vertebra tenderness - Mild lumbar vertebral tenderness.  Focal left SI 

tenderness negative straight leg test, Wounds - Healed vertical lumbar scar





- Extremities


General upper extremity: Normal inspection, Nontender, Normal color, Normal ROM

, Normal temperature


General lower extremity: Normal inspection, Nontender, Normal color, Normal ROM

, Normal temperature, Normal weight bearing.  No: Jane's sign





- Neurological


Neuro grossly intact: Yes


Cognition: Normal


Orientation: AAOx4


[place] Coma Scale Eye Opening: Spontaneous


[place] Coma Scale Verbal: Oriented


[place] Coma Scale Motor: Obeys Commands


[place] Coma Scale Total: 15


Speech: Normal


Motor strength normal: LUE, RUE, LLE, RLE


Sensory: Normal





- Psychological


Associated symptoms: Normal affect, Normal mood





- Skin


Skin Temperature: Warm


Skin Moisture: Dry


Skin Color: Normal





Course





- Re-evaluation


Re-evalutation: 





04/25/18 14:39


Patient presents with acute low back pain with left sciatica.  No signs of cord 

compression, cauda equina, infection aneurysm or other serious etiology.  

Patient is neurologically intact, independently and steadily ambulatory without 

paresthesia or neurologic deficits.  No further testing or imaging is indicated 

at this time.  Home care, follow-up with primary care and ED return precautions 

discussed with patient.  Patient verbalized understanding, is agreeable with 

plan and stable for discharge.  Short course of pain medication, muscle 

relaxant and anti-inflammatories were prescribed.


04/25/18 14:45


[place] controlled substance database reviewed.  No recent narcotic 

prescriptions written.





- Vital Signs


Vital signs: 


 











Temp Pulse Resp BP Pulse Ox


 


 99.0 F   84   17   131/71 H  99 


 


 04/25/18 12:13  04/25/18 12:13  04/25/18 12:13  04/25/18 12:13  04/25/18 12:13














Discharge





- Discharge


Clinical Impression: 


Low back pain


Qualifiers:


 Chronicity: acute Back pain laterality: left Sciatica presence: with sciatica 

Sciatica laterality: sciatica of left side Qualified Code(s): M54.42 - Lumbago 

with sciatica, left side





Condition: Stable


Disposition: HOME, SELF-CARE


Instructions:  Ice Packs ([facility]), Low Back Pain ([facility]), Oral Narcotic Medication (

[facility]), Toradol Injection ([facility]), Steroid Medication


Additional Instructions: 


Medications as prescribed


ice to area


follow up with your primary care for further evaluation and treatment


Prescriptions: 


Ibuprofen [Motrin 800 Mg Tablet] 800 mg PO Q6H #20 tablet


Methocarbamol [Robaxin 500 Mg Tablet] 1,000 mg PO Q6 #30 tablet


Oxycodone HCl/Acetaminophen [Percocet 5-325 mg Tablet] 1 - 2 tab PO ASDIR PRN #

15 tablet


 PRN Reason: 


Forms:  Return to Work


Referrals: 


DEAN MENDEZ MD [Primary Care Provider] - Follow up as needed

## 2018-07-11 ENCOUNTER — HOSPITAL ENCOUNTER (EMERGENCY)
Dept: HOSPITAL 62 - ER | Age: 33
Discharge: HOME | End: 2018-07-11
Payer: COMMERCIAL

## 2018-07-11 VITALS — DIASTOLIC BLOOD PRESSURE: 72 MMHG | SYSTOLIC BLOOD PRESSURE: 125 MMHG

## 2018-07-11 DIAGNOSIS — M54.41: Primary | ICD-10-CM

## 2018-07-11 PROCEDURE — 72110 X-RAY EXAM L-2 SPINE 4/>VWS: CPT

## 2018-07-11 PROCEDURE — 96372 THER/PROPH/DIAG INJ SC/IM: CPT

## 2018-07-11 PROCEDURE — 99283 EMERGENCY DEPT VISIT LOW MDM: CPT

## 2018-07-11 NOTE — ER DOCUMENT REPORT
HPI





- HPI


Patient complains to provider of: Low back pain


Onset: Other - 3 days


Onset/Duration: Persistent


Quality of pain: Sharp


Pain Level: 4


Context: 





She has a history of chronic low back pain and states that the pain increased 3 

days ago.  Patient states she felt a crunching sensation in her lower back.  

Patient does have an appointment with her orthopedic surgeon next week.  

Patient denies any fever.  Patient does report pain that radiates into 

bilateral lower extremities.


Associated Symptoms: Other - Low back pain.  denies: Fever, Nausea, Vomiting


Exacerbated by: Movement, Walking


Relieved by: Denies


Similar symptoms previously: Yes


Recently seen / treated by doctor: No





- ROS


ROS below otherwise negative: Yes


Systems Reviewed and Negative: Yes All other systems reviewed and negative





- CONSTITUTIONAL


Constitutional: DENIES: Fever, Chills





- NEURO


Neurology: DENIES: Weakness





- GASTROINTESTINAL


Gastrointestinal: DENIES: Nausea





- URINARY


Urinary: DENIES: Dysuria





- REPRODUCTIVE


Reproductive: DENIES: Pregnant:





- MUSCULOSKELETAL


Musculoskeletal: REPORTS: Extremity pain, Back Pain





- DERM


Skin Color: Normal


Skin Problems: None





Past Medical History





- General


Information source: Patient





- Social History


Smoking Status: Never Smoker


Frequency of alcohol use: None


Drug Abuse: None


Occupation: Call center


Lives with: Family


Family History: Malignancy - Skin and breast cancer





- Past Medical History


Cardiac Medical History: 


   Denies: Hx Coronary Artery Disease, Hx Heart Attack, Hx Hypertension


Pulmonary Medical History: Reports: Hx Pneumonia


   Denies: Hx Asthma, Hx Bronchitis, Hx COPD


Neurological Medical History: Denies: Hx Cerebrovascular Accident, Hx Seizures


Renal/ Medical History: Denies: Hx Peritoneal Dialysis


Musculoskeltal Medical History: Reports Hx Arthritis - back, Reports Hx 

Musculoskeletal Deformity - Herniated disc, Reports Hx Musculoskeletal Trauma


Psychiatric Medical History: Reports: Hx Depression - Patient took herself off 

of her medication


Past Surgical History: Reports: Hx Oral Surgery - [place] Teeth, Hx Orthopedic 

Surgery - ACL repair R knee, L ankle, 2 back





- Immunizations


Immunizations up to date: Yes


Hx Diphtheria, Pertussis, Tetanus Vaccination: Yes





Vertical Provider Document





- CONSTITUTIONAL


Agree With Documented VS: Yes


Exam Limitations: No Limitations


General Appearance: WD/WN, No Apparent Distress


Notes: 





PHYSICAL EXAMINATION:





GENERAL: Well-appearing, well-nourished and in no acute distress.





HEAD: Atraumatic, normocephalic.





EYES: sclera clear, anicteric, conjunctiva are normal.





ENT: nares patent, Moist mucous membranes.





NECK: Normal range of motion, supple no lymphadenopathy





LUNGS: respirations unlabored





HEART: Regular rate and rhythm without murmurs 





EXTREMITIES: Normal range of motion, no pitting or edema.  No cyanosis. Gait 

normal, pt ambulates without difficulty





BACK: Left lower lumbar paraspinal tenderness midline tenderness, no 

deformities or step-offs.  No CVA tenderness. 





NEUROLOGICAL: Cranial nerves grossly intact.  Normal speech, normal gait.  No 

saddle anesthesia.  No foot drop.  Negative straight leg test bilaterally





PSYCH: Normal mood, normal affect.





SKIN: Warm, Dry, normal turgor, no rashes or lesions noted.











- INFECTION CONTROL


TRAVEL OUTSIDE OF THE [place] IN LAST 30 DAYS: No





Course





- Re-evaluation


Re-evalutation: 





07/11/18 14:13


The patient presents with low back pain without signs of spinal cord compression

, cauda equina syndrome, infection, aneurysm, or other serious etiology.  The 

patient is neurologically intact.  Given the extremely risk of these diagnoses 

further testing and evaluation for these possibilities does not appear to be 

indicated at this time.  Patient has been instructed to return if the symptoms 

worsen or change in any way.





- Vital Signs


Vital signs: 


 











Temp Pulse Resp BP Pulse Ox


 


 98.6 F   80   18   120/67   100 


 


 07/11/18 13:21  07/11/18 13:21  07/11/18 13:21  07/11/18 13:21  07/11/18 13:21














- Diagnostic Test


Radiology reviewed: Reports reviewed





Discharge





- Discharge


Clinical Impression: 


Low back pain


Qualifiers:


 Chronicity: chronic Back pain laterality: left Sciatica presence: with 

sciatica Sciatica laterality: bilateral sciatica Qualified Code(s): M54.41 - 

Lumbago with sciatica, right side





Condition: Stable


Disposition: HOME, SELF-CARE


Instructions:  Ice Packs ([facility]), Low Back Pain ([facility]), Oral Narcotic Medication (

[facility])


Additional Instructions: 


Return immediately for any new or worsening symptoms





Followup with your primary care provider, call tomorrow to make a followup 

appointment








Prescriptions: 


Naproxen [Naprosyn 250 Nmg Tablet] 1 tab PO BID #14 tablet


Oxycodone HCl/Acetaminophen [Percocet 5-325 mg Tablet] 1 - 2 tab PO ASDIR PRN #

15 tablet


 PRN Reason: 


Forms:  Return to Work


Referrals: 


DEAN MENDEZ MD [Primary Care Provider] - Follow up as needed

## 2018-07-11 NOTE — RADIOLOGY REPORT (SQ)
EXAM DESCRIPTION:  L SPINE WHOLE



COMPLETED DATE/TIME:  7/11/2018 2:01 pm



REASON FOR STUDY:  low back pain, felt crunch sensation



COMPARISON:  MRI lumbar spine 10/27/2017

Lumbar spine films 5/4/2016



NUMBER OF VIEWS:  Five views including obliques.



TECHNIQUE:  AP, lateral, oblique, and sacral radiographic images acquired of the lumbar spine.



LIMITATIONS:  None.



FINDINGS:  MINERALIZATION: Normal.

SEGMENTATION: Normal.  No transitional anatomy.

ALIGNMENT: Mild convex leftward lumbar curvature, stable

VERTEBRAE: Maintained height.  No fracture or worrisome bone lesion.

DISCS: Preserved height.  No significant osteophytes or end plate irregularity.

POSTERIOR ELEMENTS: Old laminectomy at L4-5.  Bilateral facet arthropathy at L3-4, L4-5, and L5-S1

HARDWARE: None in the spine.

PARASPINAL SOFT TISSUES: Normal.

PELVIS: Not included in the field of view.  SI joints are unremarkable

OTHER: IUD in the uterus



IMPRESSION:  A laminectomy at the L4-5 level.  Convex leftward lumbar curvature with multilevel facet
 arthropathy.  No acute findings



TECHNICAL DOCUMENTATION:  JOB ID:  [id number]

 [employer]erved- All Rights Reserved



Reading location - IP/workstation name: [email or web address]

## 2018-10-25 ENCOUNTER — HOSPITAL ENCOUNTER (EMERGENCY)
Dept: HOSPITAL 62 - ER | Age: 33
Discharge: HOME | End: 2018-10-25
Payer: COMMERCIAL

## 2018-10-25 VITALS — SYSTOLIC BLOOD PRESSURE: 115 MMHG | DIASTOLIC BLOOD PRESSURE: 62 MMHG

## 2018-10-25 DIAGNOSIS — M19.90: ICD-10-CM

## 2018-10-25 DIAGNOSIS — A60.00: Primary | ICD-10-CM

## 2018-10-25 LAB
APPEARANCE UR: (no result)
APTT PPP: YELLOW S
BILIRUB UR QL STRIP: NEGATIVE
GLUCOSE UR STRIP-MCNC: NEGATIVE MG/DL
KETONES UR STRIP-MCNC: NEGATIVE MG/DL
NITRITE UR QL STRIP: NEGATIVE
PH UR STRIP: 6 [PH] (ref 5–9)
PROT UR STRIP-MCNC: NEGATIVE MG/DL
SP GR UR STRIP: 1.02
UROBILINOGEN UR-MCNC: 2 MG/DL (ref ?–2)

## 2018-10-25 PROCEDURE — 99283 EMERGENCY DEPT VISIT LOW MDM: CPT

## 2018-10-25 PROCEDURE — 81025 URINE PREGNANCY TEST: CPT

## 2018-10-25 PROCEDURE — 81001 URINALYSIS AUTO W/SCOPE: CPT

## 2018-10-25 NOTE — ER DOCUMENT REPORT
ED General





- General


Chief Complaint: Vaginal Pain


Stated Complaint: VAGINAL PAIN,SWELLING, BODY ACHES


Time Seen by Provider: 10/25/18 12:36


TRAVEL OUTSIDE OF THE [place] IN LAST 30 DAYS: No





- HPI


Notes: 





Patient is a 33-year-old female that presents to the emergency department for 

chief complaint of vaginal lesions.


Patient has history of herpes.  She states this morning she woke up with 

painful blisters on her left labia.  They have not drained anything.  She 

reports swelling in her left groin and left labial region.  She states it is a 

severe sharp pain.  She states it feels similar to previous herpes outbreaks in 

the past.  She does not take any medicine for herpes daily and states she has 

not had an outbreak in the last few years.  She denies any concern for 

pregnancy.  She denies fevers or chills.  She does report some malaise 

yesterday but had improvement of that symptom today.





Past Medical History: Negative





Past Surgical History: Back surgery





Social History: Denies drugs alcohol and tobacco





Family History: Reviewed and noncontributory for presenting illness





Allergies: Reviewed, see documented allergy list.








REVIEW OF SYSTEMS:





CONSTITUTIONAL : 





No fever





No chills





No diaphoresis





No recent illness





EENT:





No vision changes





No congestion





No sore throat  





CARDIOVASCULAR:





No chest pain





No palpitations





RESPIRATORY:





No shortness of breath





No cough





No difficulty breathing





GASTROINTESTINAL: 





No abdominal pain





No nausea





No vomiting





No diarrhea





GENITOURINARY:





No dysuria





No hematuria





No difficulty urinating





Vaginal lesion





MUSCULOSKELETAL:





No back pain





No leg pain





No arm pain





SKIN:  





No rashes





No lesions





LYMPHATIC: 





No swollen, enlarged glands.





NEUROLOGICAL: 





No lightheadedness





No headache





No weakness





No paresthesias





PSYCHIATRIC:





No anxiety





No depression 








PHYSICAL EXAMINATION:





Vital signs reviewed, nursing noted reviewed.





GENERAL: Well-appearing, well-nourished and in no acute distress.





HEAD: Atraumatic, normocephalic.





EYES: Eyes appear normal, extraocular movements intact, sclera anicteric, 

conjunctiva are normal.





ENT: nares patent, oropharynx clear without exudates.  Moist mucous membranes.





NECK: Normal range of motion, supple without lymphadenopathy





LUNGS: Breath sounds clear to auscultation bilaterally and equal.  No wheezes 

rales or rhonchi.





HEART: Regular rate and rhythm without murmurs





ABDOMEN: Soft, nontender, normoactive bowel sounds.  No rebound, guarding, or 

rigidity. No masses appreciated.





: Left labial swelling with vesicular lesions, no drainage, no areas of 

induration or erythema.  Left inguinal lymphadenopathy





EXTREMITIES: Nontender, good range of motion, no pitting or edema. 





NEUROLOGICAL: No focal neurological deficits. Moves all extremities 

spontaneously Motor and sensory grossly intact on exam.





PSYCH: Normal mood, normal affect.





SKIN: Warm, Dry, normal turgor, no rashes or lesions noted on exposed skin











- Related Data


Allergies/Adverse Reactions: 


 





No Known Allergies Allergy (Verified 07/11/18 13:05)


 











Past Medical History





- Social History


Smoking Status: Never Smoker


Frequency of alcohol use: None


Drug Abuse: None


Family History: Malignancy - Skin and breast cancer


Patient has suicidal ideation: No


Patient has homicidal ideation: No





- Past Medical History


Cardiac Medical History: 


   Denies: Hx Coronary Artery Disease, Hx Heart Attack, Hx Hypertension


Pulmonary Medical History: Reports: Hx Pneumonia


   Denies: Hx Asthma, Hx Bronchitis, Hx COPD


Neurological Medical History: Denies: Hx Cerebrovascular Accident, Hx Seizures


Renal/ Medical History: Denies: Hx Peritoneal Dialysis


Musculoskeletal Medical History: Reports Hx Arthritis - back, Reports Hx 

Musculoskeletal Deformity - Herniated disc, Reports Hx Musculoskeletal Trauma


Psychiatric Medical History: Reports: Hx Depression - Patient took herself off 

of her medication


Past Surgical History: Reports: Hx Oral Surgery - [place] Teeth, Hx Orthopedic 

Surgery - ACL repair R knee, L ankle, 2 back





- Immunizations


Immunizations up to date: Yes


Hx Diphtheria, Pertussis, Tetanus Vaccination: Yes





Review of Systems





- Review of Systems


Notes: 





Dictated





Physical Exam





- Vital signs


Vitals: 





 











Temp Pulse Resp BP Pulse Ox


 


 98.7 F   71   16   115/62   100 


 


 10/25/18 12:24  10/25/18 12:24  10/25/18 12:24  10/25/18 12:24  10/25/18 12:24














- Notes


Notes: 





Dictated





Course





- Re-evaluation


Re-evalutation: 





10/25/18 14:19


Vitals reviewed.  Nursing notes reviewed.  Patient's lesions are consistent 

with herpes.  She will be started on  acyclovir and referred to GYN for follow-

up.  Discharged home in stable condition.  She was counseled on safe sex 

practices.  All questions answered.





- Vital Signs


Vital signs: 





 











Temp Pulse Resp BP Pulse Ox


 


 98.7 F   71   16   115/62   100 


 


 10/25/18 12:24  10/25/18 12:24  10/25/18 12:24  10/25/18 12:24  10/25/18 12:24














- Laboratory


Laboratory results interpreted by me: 





 











  10/25/18





  12:50


 


Urine Urobilinogen  2.0 H














Discharge





- Discharge


Clinical Impression: 


 Herpes genitalis in women





Condition: Stable


Disposition: HOME, SELF-CARE


Instructions:  Genital Herpes ([facility]), Acyclovir ([facility])


Additional Instructions: 


Please return to the emergency department if you have any worsening, or concern 

of your symptoms.





Please return to the emergency department if you develop chest pain, difficulty 

breathing, severe abdominal pain, or ongoing vomiting.





Please follow-up with your primary care physician in 2-3 days and any other 

recommended physicians.





If prescribed, take all medications as directed. 





If you have any questions or concerns do not hesitate to return the emergency 

department for evaluation.





[]





Prescriptions: 


Acyclovir [Acyclovir 400 mg Tablet] 400 mg PO TID 5 Days  tablet


Referrals: 


DEAN MENDEZ MD [Primary Care Provider] - Follow up as needed


[facility] [Provider Group] - Follow up as needed

## 2018-10-25 NOTE — ER DOCUMENT REPORT
ED Medical Screen (RME)





- General


Chief Complaint: Vaginal Pain


Stated Complaint: VAGINAL PAIN,SWELLING, BODY ACHES


Time Seen by Provider: 10/25/18 12:36


TRAVEL OUTSIDE OF THE [place] IN LAST 30 DAYS: No





- HPI


Notes: 





10/25/18 12:45


Sexually active with boyfriend coming in for vaginal sores history of herpes 

now any medications.  States slight discharge





- Related Data


Allergies/Adverse Reactions: 


 





No Known Allergies Allergy (Verified 07/11/18 13:05)


 











Past Medical History





- Social History


Frequency of alcohol use: None


Drug Abuse: None





- Past Medical History


Cardiac Medical History: 


   Denies: Hx Coronary Artery Disease, Hx Heart Attack, Hx Hypertension


Pulmonary Medical History: Reports: Hx Pneumonia


   Denies: Hx Asthma, Hx Bronchitis, Hx COPD


Neurological Medical History: Denies: Hx Cerebrovascular Accident, Hx Seizures


Renal/ Medical History: Denies: Hx Peritoneal Dialysis


Musculoskeltal Medical History: Reports Hx Arthritis - back, Reports Hx 

Musculoskeletal Deformity - Herniated disc, Reports Hx Musculoskeletal Trauma


Psychiatric Medical History: Reports: Hx Depression - Patient took herself off 

of her medication


Past Surgical History: Reports: Hx Oral Surgery - [place] Teeth, Hx Orthopedic 

Surgery - ACL repair R knee, L ankle, 2 back





- Immunizations


Immunizations up to date: Yes


Hx Diphtheria, Pertussis, Tetanus Vaccination: Yes





Review of Systems





- Review of Systems


Female Genitourinary: Vaginal discharge





Physical Exam





- Vital signs


Vitals: 





 











Temp Pulse Resp BP Pulse Ox


 


 98.7 F   71   16   115/62   100 


 


 10/25/18 12:24  10/25/18 12:24  10/25/18 12:24  10/25/18 12:24  10/25/18 12:24














- Respiratory


Respiratory status: No respiratory distress


Chest status: Nontender


Breath sounds: Normal


Chest palpation: Normal





- Cardiovascular


Rhythm: Regular


Heart sounds: Normal auscultation





Course





- Vital Signs


Vital signs: 





 











Temp Pulse Resp BP Pulse Ox


 


 98.7 F   71   16   115/62   100 


 


 10/25/18 12:24  10/25/18 12:24  10/25/18 12:24  10/25/18 12:24  10/25/18 12:24














Doctor's Discharge





- Discharge


Referrals: 


DEAN MENDEZ MD [Primary Care Provider] - Follow up as needed

## 2018-11-15 ENCOUNTER — HOSPITAL ENCOUNTER (EMERGENCY)
Dept: HOSPITAL 62 - ER | Age: 33
Discharge: HOME | End: 2018-11-15
Payer: COMMERCIAL

## 2018-11-15 VITALS — DIASTOLIC BLOOD PRESSURE: 75 MMHG | SYSTOLIC BLOOD PRESSURE: 142 MMHG

## 2018-11-15 DIAGNOSIS — G89.29: Primary | ICD-10-CM

## 2018-11-15 DIAGNOSIS — M54.5: ICD-10-CM

## 2018-11-15 PROCEDURE — 99283 EMERGENCY DEPT VISIT LOW MDM: CPT

## 2018-11-15 PROCEDURE — 96372 THER/PROPH/DIAG INJ SC/IM: CPT

## 2018-11-15 NOTE — ER DOCUMENT REPORT
HPI





- HPI


Time Seen by Provider: 11/15/18 15:20


Pain Level: 4


Notes: 





Patient is a 33-year-old female with a history of chronic back pain status post 

2 previous surgeries who presents to the ED complaining of acute on chronic 

flareup of her back pain.  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 

she started feeling the pain in her back about 4-5 days ago.  Patient states 

that the pain will occasionally radiate down her left lower extremity because 

tingling sensation.  She is scheduled to see her neurosurgeon in about 2 weeks.

  She is otherwise eating and drinking without difficulty.  She is urinating 

normally and having normal bowel movements.  She has not had any vaginal 

discharge, odor, or bleeding.  No history of spinal abscess, IV drug abuse, or 

recent injection/procedures.  Denies any headache, fever, URI, sore throat, 

chest pain, palpitations, syncope, cough, shortness of breath, wheeze, dyspnea, 

abdominal pain, nausea/vomiting/diarrhea, urinary retention, dysuria, hematuria

, loss of control of bowel or bladder, numbness, saddle anesthesia, muscle 

paralysis/weakness, or rash.





- ROS


Systems Reviewed and Negative: Yes All other systems reviewed and negative





- REPRODUCTIVE


Reproductive: DENIES: Pregnant:





Past Medical History





- Social History


Smoking Status: Unknown if Ever Smoked


Family History: Malignancy - Skin and breast cancer





- Past Medical History


Cardiac Medical History: 


   Denies: Hx Coronary Artery Disease, Hx Heart Attack, Hx Hypertension


Pulmonary Medical History: Reports: Hx Pneumonia


   Denies: Hx Asthma, Hx Bronchitis, Hx COPD


Neurological Medical History: Denies: Hx Cerebrovascular Accident, Hx Seizures


Renal/ Medical History: Denies: Hx Peritoneal Dialysis


Musculoskeletal Medical History: Reports Hx Arthritis - back, Reports Hx 

Musculoskeletal Deformity - Herniated disc, Reports Hx Musculoskeletal Trauma


Psychiatric Medical History: Reports: Hx Depression - Patient took herself off 

of her medication


Past Surgical History: Reports: Hx Oral Surgery - [place] Teeth, Hx Orthopedic 

Surgery - ACL repair R knee, L ankle, 2 back





- Immunizations


Immunizations up to date: Yes


Hx Diphtheria, Pertussis, Tetanus Vaccination: Yes





Vertical Provider Document





- CONSTITUTIONAL


Agree With Documented VS: Yes


Notes: 





PHYSICAL EXAMINATION:





GENERAL: Well-appearing, well-nourished and in no acute distress. 





LUNGS: Breath sounds clear to auscultation bilaterally and equal.  No wheezes 

rales or rhonchi.





HEART: Regular rate and rhythm without murmurs, rubs, gallops.





ABDOMEN: Soft, nontender, nondistended abdomen.  No guarding, no rebound.  No 

masses appreciated.  Normal bowel sounds present.  No CVA tenderness 

bilaterally.  No pulsatile mass





Musculoskeletal: LE's b/l:  FROM to passive/active. Strength 5+/5.  No deficits 

noted.  No bony tenderness of extremities.





Back:  FROM to passive/active.  Strength 5+/5.  No vertebral point tenderness, 

stepoffs, or deformities.  No other bony tenderness, erythema, swelling, or 

ecchymosis.  SLR negative b/l.  + mild tenderness to the L-paraspinal mm left 

side.  Mild spasming.  + mild left SI jt tenderness.  No foot drop





Extremities:  No cyanosis, clubbing, or edema b/l.  Peripheral pulses 2+.  

Capillary refill less than 2 seconds.





NEUROLOGICAL: Normal speech, normal gait.  Normal sensory, motor exams.  

Reflexes 2+ b/l.  





PSYCH: Normal mood, normal affect.





SKIN: Warm, Dry, normal turgor, no rashes or lesions noted.





- INFECTION CONTROL


TRAVEL OUTSIDE OF THE [place] IN LAST 30 DAYS: No





Course





- Re-evaluation


Re-evalutation: 





11/15/18 15:28


Patient is an afebrile, well-hydrated, 33-year-old female who presents to the 

ED with acute on chronic low back pain.  Vitals are acceptable.  PE is 

otherwise unremarkable for any focal neurological deficits.  Patient was given 

Decadron, Toradol, and Lidoderm patch. She has no significant tachycardia, 

tachypnea, or hypoxia.  She is nontoxic-appearing and is tolerating p.o. 

without difficulties.  There are no signs of infection.  No other red flag 

symptoms noted.  No other labs or imaging warranted at this time based on H&P.  

Low suspicion for any meningitis, fracture, expanding/ruptured AAA, cauda 

equina syndrome, epidural mass lesion/abscess, herniated disc causing severe 

spinal stenosis, or other systemic infection at this time.  Patient is aware 

that his condition can change from initial presentation and that she needs 

monitor symptoms closely for any acute changes.  I will send him home with a 

prescription for baclofen and naproxen.  Conservative measures otherwise for 

symptoms.  Recheck with your PCM in 3-5 days.  Consider consult with orthopedic/

physical therapy.  Return to the ED with any worsening/concerning symptoms 

otherwise as reviewed discharge.  Patient is in agreement.





- Vital Signs


Vital signs: 


 











Temp Pulse Resp BP Pulse Ox


 


 98.8 F   70   18   142/75 H  98 


 


 11/15/18 13:28  11/15/18 13:28  11/15/18 13:28  11/15/18 13:28  11/15/18 13:28














Discharge





- Discharge


Clinical Impression: 


Low back pain


Qualifiers:


 Chronicity: acute Back pain laterality: left Sciatica presence: unspecified 

whether sciatica present Qualified Code(s): M54.5 - Low back pain





Condition: Stable


Disposition: HOME, SELF-CARE


Instructions:  Low Back Pain ([facility])


Additional Instructions: 


Rest, Ice, Compression, Elevation


Tylenol/ibuprofen as needed


Light stretches daily


Strength exercises as able


Moist heat and massage may help


F/u with your PCP/Neurosurgery in 3-5 days for a recheck


Consider consult(s) with Orthopedics/physical therapy for ongoing/worsening 

symptoms





Return to the ED with any worsening symptoms and/or development of fever, 

headache, chest pain, palpitations, syncope, shortness of breath, trouble 

breathing, abdominal pain, n/v/d, blood in stool/urine, loss of control of bowel

/bladder, urinary retention, muscle weakness/paralysis, saddle anesthesia, 

numbness/tingling, or other worsening symptoms that are concerning to you.


Prescriptions: 


Baclofen [Baclofen 10 mg Tablet] 5 - 10 mg PO BID PRN #10 tablet


 PRN Reason: 


Naproxen 500 mg PO BID #20 tablet


Forms:  Elevated Blood Pressure


Referrals: 


DEAN MENDEZ MD [Primary Care Provider] - Follow up in 3-5 days

## 2019-01-16 ENCOUNTER — HOSPITAL ENCOUNTER (EMERGENCY)
Dept: HOSPITAL 62 - ER | Age: 34
Discharge: HOME | End: 2019-01-16
Payer: COMMERCIAL

## 2019-01-16 VITALS — SYSTOLIC BLOOD PRESSURE: 143 MMHG | DIASTOLIC BLOOD PRESSURE: 79 MMHG

## 2019-01-16 DIAGNOSIS — R20.0: ICD-10-CM

## 2019-01-16 DIAGNOSIS — M54.31: Primary | ICD-10-CM

## 2019-01-16 PROCEDURE — 99283 EMERGENCY DEPT VISIT LOW MDM: CPT

## 2019-01-16 NOTE — ER DOCUMENT REPORT
HPI





- HPI


Patient complains to provider of: Right lower extremity pain


Time Seen by Provider: 01/16/19 15:18


Onset: Yesterday


Onset/Duration: Gradual


Quality of pain: Achy


Pain Level: 4


Context: 





Patient states that she was sitting in court for 6 hours and developed right leg

pain that goes down to the level of her knee.  Patient does have a history of 

chronic back pain but does not typically have pain in her right lower extremity.

 Patient states that the toes of the right foot are numb although she denies any

other numbness to the lower extremity.  Patient denies any pain or paresthesia 

to the calf.  She denies any urinary retention or incontinence.  Patient denies 

any fever history of IV drug use.


Associated Symptoms: Other - Right leg pain.  denies: Fever


Exacerbated by: Sitting, Movement


Relieved by: Denies


Similar symptoms previously: Yes - Typically involves the left side


Recently seen / treated by doctor: No





- ROS


ROS below otherwise negative: Yes


Systems Reviewed and Negative: Yes All other systems reviewed and negative





- CONSTITUTIONAL


Constitutional: DENIES: Fever, Chills





- NEURO


Neurology: DENIES: Weakness





- CARDIOVASCULAR


Cardiovascular: DENIES: Chest pain





- RESPIRATORY


Respiratory: DENIES: Coughing





- GASTROINTESTINAL


Gastrointestinal: DENIES: Nausea





- URINARY


Urinary: DENIES: Dysuria





- REPRODUCTIVE


Reproductive: DENIES: Pregnant:





- MUSCULOSKELETAL


Musculoskeletal: REPORTS: Extremity pain





- DERM


Skin Color: Normal


Skin Problems: None





Past Medical History





- General


Information source: Patient





- Social History


Smoking Status: Never Smoker


Chew tobacco use (# tins/day): No


Frequency of alcohol use: None


Drug Abuse: None


Occupation: Call center


Family History: Malignancy - Skin and breast cancer


Patient has suicidal ideation: No


Patient has homicidal ideation: No


Pulmonary Medical History: Reports: Hx Pneumonia


Renal/ Medical History: Denies: Hx Peritoneal Dialysis


Musculoskeletal Medical History: Reports Hx Arthritis - back, Reports Hx 

Musculoskeletal Deformity - Herniated disc, Reports Hx Musculoskeletal Trauma


Psychiatric Medical History: Reports: Hx Depression - Patient took herself off 

of her medication


Past Surgical History: Reports: Hx Oral Surgery - [place] Teeth, Hx Orthopedic 

Surgery - ACL repair R knee, L ankle, 2 back





- Immunizations


Immunizations up to date: Yes


Hx Diphtheria, Pertussis, Tetanus Vaccination: Yes





Vertical Provider Document





- CONSTITUTIONAL


Agree With Documented VS: Yes


Exam Limitations: No Limitations


General Appearance: WD/WN, No Apparent Distress


Notes: 





PHYSICAL EXAMINATION:





GENERAL: Well-appearing, well-nourished and in no acute distress.





HEAD: Atraumatic, normocephalic.





EYES: sclera clear, anicteric, conjunctiva are normal.





ENT: nares patent, Moist mucous membranes.





NECK: Normal range of motion, supple no lymphadenopathy





LUNGS: respirations unlabored





HEART: Regular rate and rhythm without murmurs 





EXTREMITIES: Normal range of motion, no pitting or edema.  No cyanosis. Gait 

normal, pt ambulates without difficulty





BACK: Right SI joint tenderness, tenderness over right piriformis, no lumbar 

midline tenderness, no deformities or step-offs.  No CVA tenderness. 





NEUROLOGICAL: Cranial nerves grossly intact.  Normal speech, normal gait.  No 

saddle anesthesia.  Negative straight leg test on the right, 1+ bilateral 

patellar reflexes, 2+ bilateral Achilles reflexes





PSYCH: Normal mood, normal affect.





SKIN: Warm, Dry, normal turgor, no rashes or lesions noted.











- INFECTION CONTROL


TRAVEL OUTSIDE OF THE [place] IN LAST 30 DAYS: No





Course





- Re-evaluation


Re-evalutation: 





01/16/19 15:47


Consulted with Dr. Ferrara regarding patient presentation and physical exam 

findings.  Recommend symptomatic treatment and outpatient follow-up with her 

orthopedic doctor.  The patient presents with low back pain without signs of 

spinal cord compression, cauda equina syndrome, infection, aneurysm, or other 

serious etiology.  Given the extremely risk of these diagnoses further testing 

and evaluation for these possibilities does not appear to be indicated at this 

time.  Patient has been instructed to return if the symptoms worsen or change in

any way.





- Vital Signs


Vital signs: 


                                        











Temp Pulse Resp BP Pulse Ox


 


 98.7 F   81   16   133/79 H  99 


 


 01/16/19 14:31  01/16/19 14:31  01/16/19 14:31  01/16/19 14:31  01/16/19 14:31














Discharge





- Discharge


Clinical Impression: 


Sciatica


Qualifiers:


 Laterality: right Qualified Code(s): M54.31 - Sciatica, right side





Condition: Stable


Disposition: HOME, SELF-CARE


Instructions:  Sciatica ([facility]), Steroid Medication


Additional Instructions: 


Return immediately for any new or worsening symptoms





Followup with your primary care provider, call tomorrow to make a followup 

appointment





Follow-up with your orthopedic spinal specialist for recheck


Prescriptions: 


Diclofenac Epolamine [Flector] 1 each TP BID PRN #10 adh..patch


 PRN Reason: 


Oxycodone HCl/Acetaminophen [Percocet 5-325 mg Tablet] 1 tab PO ASDIR PRN #10 

tablet


 PRN Reason: 


RX: Prednisone [Deltasone 20 mg Tablet] 2 tab PO DAILY 4 Days  tablet


Forms:  Return to Work


Referrals: 


[facility] [Provider Group] - Follow up as needed

## 2019-01-28 ENCOUNTER — HOSPITAL ENCOUNTER (EMERGENCY)
Dept: HOSPITAL 62 - ER | Age: 34
Discharge: HOME | End: 2019-01-28
Payer: COMMERCIAL

## 2019-01-28 VITALS — SYSTOLIC BLOOD PRESSURE: 140 MMHG | DIASTOLIC BLOOD PRESSURE: 65 MMHG

## 2019-01-28 DIAGNOSIS — Z87.01: ICD-10-CM

## 2019-01-28 DIAGNOSIS — R07.89: ICD-10-CM

## 2019-01-28 DIAGNOSIS — J06.9: Primary | ICD-10-CM

## 2019-01-28 DIAGNOSIS — M54.9: ICD-10-CM

## 2019-01-28 DIAGNOSIS — J34.89: ICD-10-CM

## 2019-01-28 DIAGNOSIS — R50.9: ICD-10-CM

## 2019-01-28 DIAGNOSIS — J02.9: ICD-10-CM

## 2019-01-28 LAB
A TYPE INFLUENZA AG: NEGATIVE
B INFLUENZA AG: NEGATIVE

## 2019-01-28 PROCEDURE — 87077 CULTURE AEROBIC IDENTIFY: CPT

## 2019-01-28 PROCEDURE — 99284 EMERGENCY DEPT VISIT MOD MDM: CPT

## 2019-01-28 PROCEDURE — 87804 INFLUENZA ASSAY W/OPTIC: CPT

## 2019-01-28 PROCEDURE — 87070 CULTURE OTHR SPECIMN AEROBIC: CPT

## 2019-01-28 PROCEDURE — 93005 ELECTROCARDIOGRAM TRACING: CPT

## 2019-01-28 PROCEDURE — 87880 STREP A ASSAY W/OPTIC: CPT

## 2019-01-28 PROCEDURE — 93010 ELECTROCARDIOGRAM REPORT: CPT

## 2019-01-28 PROCEDURE — 71046 X-RAY EXAM CHEST 2 VIEWS: CPT

## 2019-01-28 NOTE — RADIOLOGY REPORT (SQ)
EXAM DESCRIPTION:  CHEST 2 VIEWS



COMPLETED DATE/TIME:  1/28/2019 10:20 am



REASON FOR STUDY:  cough, back pain



COMPARISON:  None.



EXAM PARAMETERS:  NUMBER OF VIEWS: two views

TECHNIQUE: Digital Frontal and Lateral radiographic views of the chest acquired.

RADIATION DOSE: NA

LIMITATIONS: none



FINDINGS:  LUNGS AND PLEURA: No opacities, masses or pneumothorax. No pleural effusion.

MEDIASTINUM AND HILAR STRUCTURES: No masses or contour abnormalities.

HEART AND VASCULAR STRUCTURES: Heart normal size.  No evidence for failure.

BONES: No acute findings.

HARDWARE: None in the chest.

OTHER: No other significant finding.



IMPRESSION:  NO ACUTE RADIOGRAPHIC FINDING IN THE CHEST.



TECHNICAL DOCUMENTATION:  JOB ID:  [id number]

 2011 [employer]- All Rights Reserved



Reading location - IP/workstation name: [email or web address]

## 2019-01-28 NOTE — ER DOCUMENT REPORT
HPI





- HPI


Patient complains to provider of: Cold symptoms


Time Seen by Provider: 01/28/19 09:17


Onset: Other - 2 days


Onset/Duration: Persistent


Quality of pain: Achy


Pain Level: 4


Context: 





Patient presents complaining of cold symptoms for the past 2 days.  Patient does

report multiple sick contacts in the household recently.  Patient complains of 

sore throat, congestion, chest and back pain.  Patient reports chest and back 

pain only with coughing or taking a deep breath.  Patient reports fever of 1012 

today.


Associated Symptoms: Chest pain, Fever, Rhinnorhea, Sore throat.  denies: Nausea


Exacerbated by: Coughing


Relieved by: Remaining still


Similar symptoms previously: No


Recently seen / treated by doctor: Yes





- ROS


ROS below otherwise negative: Yes


Systems Reviewed and Negative: Yes All other systems reviewed and negative





- CONSTITUTIONAL


Constitutional: REPORTS: Fever





- EENT


EENT: REPORTS: Sore Throat, Nasal Drainage-Clear, Congestion





- CARDIOVASCULAR


Cardiovascular: REPORTS: Chest pain





- RESPIRATORY


Respiratory: REPORTS: Coughing.  DENIES: Trouble Breathing





- GASTROINTESTINAL


Gastrointestinal: DENIES: Abdominal Pain, Nausea, Patient vomiting





- REPRODUCTIVE


LMP: 1/10/19


Reproductive: DENIES: Pregnant:





- MUSCULOSKELETAL


Musculoskeletal: REPORTS: Back Pain





- DERM


Skin Color: Normal


Skin Problems: None





Past Medical History





- General


Information source: Patient





- Social History


Smoking Status: Never Smoker


Frequency of alcohol use: None


Drug Abuse: None


Occupation: Call center


Lives with: Family


Family History: Malignancy - Skin and breast cancer


Pulmonary Medical History: Reports: Hx Pneumonia


Renal/ Medical History: Denies: Hx Peritoneal Dialysis


Musculoskeletal Medical History: Reports Hx Arthritis - back, Reports Hx 

Musculoskeletal Deformity - Herniated disc, Reports Hx Musculoskeletal Trauma


Psychiatric Medical History: Reports: Hx Depression - Patient took herself off 

of her medication


Past Surgical History: Reports: Hx Oral Surgery - [place] Teeth, Hx Orthopedic 

Surgery - ACL repair R knee, L ankle, 2 back





- Immunizations


Immunizations up to date: Yes


Hx Diphtheria, Pertussis, Tetanus Vaccination: Yes





Vertical Provider Document





- CONSTITUTIONAL


Agree With Documented VS: Yes


Exam Limitations: No Limitations





- INFECTION CONTROL


TRAVEL OUTSIDE OF THE [place] IN LAST 30 DAYS: No





- HEENT


HEENT: Atraumatic, Normocephalic, Pharyngeal Tenderness, Pharyngeal Erythema.  

negative: Pharyngeal Exudate, Tympanic Membrane Red, Tympanic Membrane Bulging





- NECK


Neck: Normal Inspection, Supple.  negative: Lymphadenopathy-Left, 

Lymphadenopathy-Right





- RESPIRATORY


Respiratory: Breath Sounds Normal, No Respiratory Distress, Chest Non-Tender - 

with cough/deep inspiration





- CARDIOVASCULAR


Cardiovascular: Regular Rate, Regular Rhythm, No Murmur





- GI/ABDOMEN


Gastrointestinal: Abdomen Soft, Abdomen Non-Tender





- BACK


Back: Normal Inspection





- MUSCULOSKELETAL/EXTREMETIES


Musculoskeletal/Extremeties: MAEW





- NEURO


Level of Consciousness: Awake, Alert, Appropriate


Motor/Sensory: No Motor Deficit





- DERM


Integumentary: Warm, Dry, No Rash





Course





- Re-evaluation


Re-evalutation: 





01/28/19 


Patient presents with symptoms consistent with upper respiratory infection.  No 

concern for pneumonia or pneumothorax.  Patient PERC negative.  The patient has 

atypical chest pain as the patient's chest pain is not suggestive of pulmonary 

embolus, cardiac ischemia, aortic dissection, or other serious etiology.  Given 

the extremely low risk of these diagnoses for the test in evaluation for these 

possibilities does not appear to be indicated at this time.  Patient has been 

instructed to return if the symptoms worsen or change in any way.





- Vital Signs


Vital signs: 


                                        











Temp Pulse Resp BP Pulse Ox


 


 98.3 F   90   20   125/62   97 


 


 01/28/19 09:03  01/28/19 09:03  01/28/19 09:03  01/28/19 09:03  01/28/19 09:03














- Laboratory


Laboratory results interpreted by me: 





01/28/19 10:45


                               Labs- Entire Visit











  01/28/19 01/28/19





  09:27 09:44


 


Influenza A (Rapid)   NEGATIVE


 


Influenza B (Rapid)   NEGATIVE


 


Group A Strep Rapid  NEGATIVE 














- Diagnostic Test


Radiology reviewed: Reports reviewed





- EKG Interpretation by Me


EKG shows normal: Sinus rhythm


Rate: Normal


Rhythm: NSR


When compared to previous EKG there are: Previous EKG unavailable


Additional EKG results interpreted by me: 





01/28/19 10:45


, no T wave inversion, no ST elevation, no ischemic changes.





Discharge





- Discharge


Clinical Impression: 


 Chest wall pain





Upper respiratory infection


Qualifiers:


 URI type: unspecified URI Qualified Code(s): J06.9 - Acute upper respiratory 

infection, unspecified





Condition: Stable


Disposition: HOME, SELF-CARE


Instructions:  Acetaminophen, Chest Wall Pain ([facility]), Fever ([facility]), Upper 

Respiratory Illness ([facility])


Additional Instructions: 


Return immediately for any new or worsening symptoms





Followup with your primary care provider, call tomorrow to make a followup 

appointment








Prescriptions: 


Guaifenesin/Pseudoephedrne HCl [Mucinex D ER 1,200-120 mg Tab] 1 each PO Q12 PRN

#12 tab.er.12h


 PRN Reason: 


Naproxen [Naprosyn 250 Nmg Tablet] 1 tab PO BID #14 tablet


Forms:  Return to Work


Referrals: 


[facility] [Provider Group] - Follow up as needed

## 2019-02-25 ENCOUNTER — HOSPITAL ENCOUNTER (EMERGENCY)
Dept: HOSPITAL 62 - ER | Age: 34
Discharge: HOME | End: 2019-02-25
Payer: COMMERCIAL

## 2019-02-25 VITALS — DIASTOLIC BLOOD PRESSURE: 76 MMHG | SYSTOLIC BLOOD PRESSURE: 124 MMHG

## 2019-02-25 DIAGNOSIS — Z80.3: ICD-10-CM

## 2019-02-25 DIAGNOSIS — Z87.891: ICD-10-CM

## 2019-02-25 DIAGNOSIS — N64.4: Primary | ICD-10-CM

## 2019-02-25 LAB
APPEARANCE UR: CLEAR
APTT PPP: YELLOW S
BILIRUB UR QL STRIP: NEGATIVE
GLUCOSE UR STRIP-MCNC: NEGATIVE MG/DL
KETONES UR STRIP-MCNC: NEGATIVE MG/DL
NITRITE UR QL STRIP: NEGATIVE
PH UR STRIP: 9 [PH] (ref 5–9)
PROT UR STRIP-MCNC: NEGATIVE MG/DL
SP GR UR STRIP: 1.01
UROBILINOGEN UR-MCNC: NEGATIVE MG/DL (ref ?–2)

## 2019-02-25 PROCEDURE — 99283 EMERGENCY DEPT VISIT LOW MDM: CPT

## 2019-02-25 PROCEDURE — 81001 URINALYSIS AUTO W/SCOPE: CPT

## 2019-02-25 PROCEDURE — 81025 URINE PREGNANCY TEST: CPT

## 2019-02-25 NOTE — ER DOCUMENT REPORT
ED General





- General


Chief Complaint: Breast Problem


Stated Complaint: BREAST PAIN


Time Seen by Provider: 02/25/19 12:20


Mode of Arrival: Ambulatory


Notes: 


33-year-old female presents with right breast pain that has been present for 

approximately 3 weeks.  Patient is concerned because she has a family history of

breast cancer.  Her maternal grandmother and sister both suffered from breast 

cancer and her sister was diagnosed at the age of 34.  Patient denies any 

associated fever, chills, nausea, vomiting, erythema.  Patient states she has 

tried heat, changing bras, she took Motrin, hot showers, and is noticed that her

breast overall is getting larger.  No shortness of breath or chest pain she 

denies any associated cyclical changes.  She does complain of body aches but has

recent sick contacts in the household.


TRAVEL OUTSIDE OF THE [place] IN LAST 30 DAYS: No





- Related Data


Allergies/Adverse Reactions: 


                                        





No Known Allergies Allergy (Verified 01/28/19 09:00)


   











Past Medical History





- General


Information source: Patient





- Social History


Smoking Status: Former Smoker


Chew tobacco use (# tins/day): No


Frequency of alcohol use: None


Drug Abuse: None


Family History: Malignancy - Skin and breast cancer


Patient has suicidal ideation: No


Patient has homicidal ideation: No





- Past Medical History


Cardiac Medical History: 


   Denies: Hx Coronary Artery Disease, Hx Heart Attack, Hx Hypertension


Pulmonary Medical History: Reports: Hx Pneumonia


   Denies: Hx Asthma, Hx Bronchitis, Hx COPD


Neurological Medical History: Denies: Hx Cerebrovascular Accident, Hx Seizures


Renal/ Medical History: Denies: Hx Peritoneal Dialysis


Musculoskeletal Medical History: Reports Hx Arthritis - back, Reports Hx 

Musculoskeletal Deformity - Herniated disc, Reports Hx Musculoskeletal Trauma


Psychiatric Medical History: Reports: Hx Depression - Patient took herself off 

of her medication


Past Surgical History: Reports: Hx Oral Surgery - [place] Teeth, Hx Orthopedic 

Surgery - ACL repair R knee, L ankle, 2 back





- Immunizations


Immunizations up to date: Yes


Hx Diphtheria, Pertussis, Tetanus Vaccination: Yes





Review of Systems





- Review of Systems


Constitutional: See HPI


EENT: No symptoms reported


Cardiovascular: See HPI


Respiratory: See HPI


Gastrointestinal: See HPI


Genitourinary: No symptoms reported


Female Genitourinary: No symptoms reported


Musculoskeletal: See HPI


Skin: No symptoms reported


Hematologic/Lymphatic: No symptoms reported


Neurological/Psychological: No symptoms reported





Physical Exam





- Vital signs


Vitals: 





                                        











Temp Pulse Resp BP Pulse Ox


 


 99.0 F   76   16   146/80 H  99 


 


 02/25/19 10:39  02/25/19 10:39  02/25/19 10:39  02/25/19 10:39  02/25/19 10:39














- Notes


Notes: 





PHYSICAL EXAMINATION:





Reviewed vital signs and charting by RN





GENERAL: Alert, interacts well. No acute distress.


HEAD: Normocephalic, atraumatic.


EYES: Pupils equal, round. Extraocular movements intact.


ENT: Oral mucosa moist, tongue midline. 


NECK: Full range of motion. Trachea midline.


EXTREMITIES: Moves all 4 extremities spontaneously. No edema,  No cyanosis.


BACK: no cervical, thoracic, lumbar midline tenderness. No saddle anesthesia, 

normal distal neurovascular exam. 


NEUROLOGICAL: Alert and oriented x3. Normal speech. 


PSYCH: Normal affect, normal mood.


SKIN: Warm, dry, normal turgor. No rashes or lesions noted.





Right breast exam performed.  From mid axillary line caudal moving 

longitudinally up and down from clavicle to bra line across to sternum.  No 

grossly abnormal masses felt, no hard masses felt, cystic tissue filled with 

tenderness to palpation.  No erythema, no evidence of fluctuance or induration 

suggesting an abscess.





Course





- Re-evaluation


Re-evalutation: 





02/25/19 16:43


Patient with strong family history of breast cancer.  Patient is requesting some

type of access to get mammogram.  We have consulted with Jordan HALEY who is going to

give the patient community resources to try to get plugged into the system.  

There is no emergent pathology at this time.





- Vital Signs


Vital signs: 





                                        











Temp Pulse Resp BP Pulse Ox


 


 99.0 F   76   16   146/80 H  99 


 


 02/25/19 10:39  02/25/19 10:39  02/25/19 10:39  02/25/19 10:39  02/25/19 10:39














- Laboratory


Laboratory results interpreted by me: 





                                        











  02/25/19





  13:11


 


Urine Blood  SMALL H














Discharge





- Discharge


Clinical Impression: 


 Breast pain, right





Condition: Good


Disposition: HOME, SELF-CARE


Additional Instructions: 


You were seen in the emergency department this afternoon for right breast pain. 

There were no gross abnormalities identified during the clinical breast exam.  

We have given you contact information for Jodran, our community outreach 

coordinator, who is going to provide you with resources to help you get a 

primary provider she can get a mammogram.  If you have worsening breast pain, 

redness, your breast is hot, you develop a fever greater than 101, you notice 

red streaks moving in your armpit, please return to the emergency department or 

get seen for treatment.

## 2019-02-25 NOTE — ER DOCUMENT REPORT
ED Medical Screen (RME)





- General


Chief Complaint: Breast Problem


Stated Complaint: BREAST PAIN


Time Seen by Provider: 02/25/19 12:20


Mode of Arrival: Ambulatory


Information source: Patient


Notes: 





33-year-old female presents with right breast pain that has been present for 

approximately 3 weeks.  Patient is concerned because she has a family history of

breast cancer.  Her mother and sister both suffered from breast cancer and her 

sister was diagnosed at the age of 34.  Patient denies any associated fever, 

chills, nausea, vomiting, erythema











I have greeted and performed a rapid initial assessment of this patient.  A 

comprehensive ED assessment and evaluation of the patient, analysis of test 

results and completion of medical decision making process we will be contacted 

by additional ED providers.











PHYSICAL EXAMINATION:





Vital signs reviewed





GENERAL: Well-appearing, well-nourished and in no acute distress.





LUNGS: No respiratory distress





Musculoskeletal: Normal range of motion





NEUROLOGICAL:  Normal speech, normal gait. 





PSYCH: Normal mood, normal affect.





SKIN: Warm, Dry, normal turgor, no rashes or lesions noted.


TRAVEL OUTSIDE OF THE [place] IN LAST 30 DAYS: No





- HPI


Onset: Other


Onset/Duration: Gradual, Persistent


Quality of pain: Throbbing


Severity: Moderate


Associated Symptoms: Body/muscle aches.  denies: Chest pain, Fever, Nausea


Exacerbated by: Other - Touching


Relieved by: Denies


Similar symptoms previously: No


Recently seen / treated by doctor: No





- Related Data


Smoking: Non-smoker


Frequency of alcohol use: None


Drug Abuse: None


Allergies/Adverse Reactions: 


                                        





No Known Allergies Allergy (Verified 01/28/19 09:00)


   











Past Medical History





- Past Medical History


Cardiac Medical History: 


   Denies: Hx Coronary Artery Disease, Hx Heart Attack, Hx Hypertension


Pulmonary Medical History: Reports: Hx Pneumonia


   Denies: Hx Asthma, Hx Bronchitis, Hx COPD


Neurological Medical History: Denies: Hx Cerebrovascular Accident, Hx Seizures


Renal/ Medical History: Denies: Hx Peritoneal Dialysis


Musculoskeltal Medical History: Reports Hx Arthritis - back, Reports Hx 

Musculoskeletal Deformity - Herniated disc, Reports Hx Musculoskeletal Trauma


Psychiatric Medical History: Reports: Hx Depression - Patient took herself off 

of her medication


Past Surgical History: Reports: Hx Oral Surgery - [place] Teeth, Hx Orthopedic 

Surgery - ACL repair R knee, L ankle, 2 back





- Immunizations


Immunizations up to date: Yes


Hx Diphtheria, Pertussis, Tetanus Vaccination: Yes





Physical Exam





- Vital signs


Vitals: 





                                        











Temp Pulse Resp BP Pulse Ox


 


 99.0 F   76   16   146/80 H  99 


 


 02/25/19 10:39  02/25/19 10:39  02/25/19 10:39  02/25/19 10:39  02/25/19 10:39














Course





- Vital Signs


Vital signs: 





                                        











Temp Pulse Resp BP Pulse Ox


 


 99.0 F   76   16   146/80 H  99 


 


 02/25/19 10:39  02/25/19 10:39  02/25/19 10:39  02/25/19 10:39  02/25/19 10:39

## 2019-06-04 ENCOUNTER — HOSPITAL ENCOUNTER (EMERGENCY)
Dept: HOSPITAL 62 - ER | Age: 34
Discharge: HOME | End: 2019-06-04
Payer: COMMERCIAL

## 2019-06-04 VITALS — SYSTOLIC BLOOD PRESSURE: 143 MMHG | DIASTOLIC BLOOD PRESSURE: 88 MMHG
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
## 2019-06-04 DIAGNOSIS — S69.92XA: Primary | ICD-10-CM

## 2019-06-04 PROCEDURE — 99283 EMERGENCY DEPT VISIT LOW MDM: CPT

## 2019-06-04 NOTE — RADIOLOGY REPORT (SQ)
EXAM DESCRIPTION:  HAND LEFT 3 VIEWS



COMPLETED DATE/TIME:  6/4/2019 11:00 am



REASON FOR STUDY:  swelling and pain



COMPARISON:  None.



EXAM PARAMETERS:  NUMBER OF VIEWS: Three views.

TECHNIQUE: AP, lateral and oblique  radiographic images acquired of the left hand.

LIMITATIONS: None.



FINDINGS:  MINERALIZATION: Normal.

BONES: No acute fracture or dislocation.  No worrisome bone lesions.

JOINTS: No effusions.

SOFT TISSUES: No soft tissue swelling.  No foreign body.

OTHER: No other significant finding.



IMPRESSION:  1.  No acute osseous findings.



TECHNICAL DOCUMENTATION:  JOB ID:  [id number]

 2011 [employer]- All Rights Reserved



Reading location - IP/workstation name: [email or web address]

## 2019-06-04 NOTE — ER DOCUMENT REPORT
HPI





- HPI


Patient complains to provider of: left hand injury


Time Seen by Provider: 06/04/19 11:10


Onset: Yesterday


Onset/Duration: Persistent


Quality of pain: Achy


Severity: Severe


Pain Level: 4


Context: 





Patient presents emergency department with complaints of left hand pain since 

yesterday.  Patient is right-hand dominant.  Patient reports she tripped over 

something in her house may be the carpet and hit the doorway hyper extending her

thumb backwards.  Left hand thenar aspect swollen with ecchymosis.  Good cap 

refill.  Has full range of motion but complains of pain with movement.  Denies 

all other symptoms such as fever vomiting diarrhea.  Reports she took Motrin and

Aleve without relief of pain.


Associated Symptoms: None


Exacerbated by: Movement


Relieved by: Denies


Similar symptoms previously: No


Recently seen / treated by doctor: No





- REPRODUCTIVE


Reproductive: DENIES: Pregnant:





- MUSCULOSKELETAL


Musculoskeletal: REPORTS: Extremity pain - left thumb





Past Medical History





- General


Information source: Patient





- Social History


Smoking Status: Never Smoker


Chew tobacco use (# tins/day): No


Drug Abuse: None


Occupation: [employer]


Lives with: Family


Family History: Malignancy - Skin and breast cancer


Patient has suicidal ideation: No


Patient has homicidal ideation: No





- Past Medical History


Cardiac Medical History: 


   Denies: Hx Coronary Artery Disease, Hx Heart Attack, Hx Hypertension


Pulmonary Medical History: Reports: Hx Pneumonia


   Denies: Hx Asthma, Hx Bronchitis, Hx COPD


Neurological Medical History: Denies: Hx Cerebrovascular Accident, Hx Seizures


Renal/ Medical History: Denies: Hx Peritoneal Dialysis


Musculoskeletal Medical History: Reports Hx Arthritis - back, Reports Hx 

Musculoskeletal Deformity - Herniated disc, Reports Hx Musculoskeletal Trauma


Psychiatric Medical History: Reports: Hx Depression - Patient took herself off 

of her medication


Past Surgical History: Reports: Hx Oral Surgery - [place] Teeth, Hx Orthopedic 

Surgery - ACL repair R knee, L ankle, 2 back





- Immunizations


Immunizations up to date: Yes


Hx Diphtheria, Pertussis, Tetanus Vaccination: Yes





Vertical Provider Document





- CONSTITUTIONAL


Agree With Documented VS: Yes


Exam Limitations: No Limitations


General Appearance: WD/WN, No Apparent Distress - winces when hand palpated





- INFECTION CONTROL


TRAVEL OUTSIDE OF THE [place] IN LAST 30 DAYS: No





- HEENT


HEENT: Atraumatic, Normocephalic





- NECK


Neck: Supple





- RESPIRATORY


Respiratory: No Respiratory Distress





- CARDIOVASCULAR


Cardiovascular: Regular Rate





- MUSCULOSKELETAL/EXTREMETIES


Musculoskeletal/Extremeties: BHAVNA, FROM, Tender - left thenar ttp, ecchymosis 

and swelling noted, good cap refill





Course





- Re-evaluation


Re-evalutation: 





06/04/19 11:45


Hand x-ray negative.  Patient is very tender in the scaphoid area of her left 

hand.  Will treat with thumb spica for protection.  Was instructed on this 

instructed to follow-up with orthopedics for recheck and evaluation within the 

next week.  She verbalized understanding to all instructions.








Dictation of this chart was performed using voice recognition software; 

therefore, there may be some unintended grammatical errors.


 








- Vital Signs


Vital signs: 


                                        











Temp Pulse Resp BP Pulse Ox


 


 98.6 F   88   18   143/88 H  99 


 


 06/04/19 10:13  06/04/19 10:13  06/04/19 10:13  06/04/19 10:13  06/04/19 10:13














- Diagnostic Test


Radiology reviewed: Image reviewed, Reports reviewed - EXAM DESCRIPTION: HAND 

LEFT 3 VIEWS   COMPLETED DATE/TIME: 6/4/2019 11:00 am   REASON FOR STUDY: 

swelling and pain   COMPARISON: None.   EXAM PARAMETERS: NUMBER OF VIEWS: Three 

views.  TECHNIQUE: AP, lateral and oblique radiographic images acquired of the 

left hand.  LIMITATIONS: None.   FINDINGS: MINERALIZATION: Normal.  BONES: No 

acute fracture or dislocation. No worrisome bone lesions.  JOINTS: No effusions.

 SOFT TISSUES: No soft tissue swelling. No foreign body.  OTHER: No other 

significant finding.   IMPRESSION: 1. No acute osseous findings





Procedures





- Immobilization


  ** Left Hand


Pre-Proc Neuro Vasc Exam: Normal


Immobilizer type: Thumb spica, Sling


Performed by: PCT - nishant


Post-Proc Neuro Vasc Exam: Unchanged from pre-exam


Alignment checked and good: Yes





Discharge





- Discharge


Clinical Impression: 


Injury of left hand


Qualifiers:


 Encounter type: initial encounter Qualified Code(s): S69.92XA - Unspecified 

injury of left wrist, hand and finger(s), initial encounter





Condition: Stable


Disposition: HOME, SELF-CARE


Instructions:  Use of Over-The-Counter Ibuprofen ([facility]), Ice & Elevation ([facility]), 

Splint Pending Casting ([facility]), Temporary Sling ([facility])


Additional Instructions: 


*You have been evaluated for left hand injury


*Maintain the splint


*Rest/Ice/Elevate your hand


*Follow up with orthopedics for recheck and evaluation within one week


*Take motrin or aleve for pain as indicated


*Return to ED for worsening condition, changes, needs











Monitor your blood pressure. Your blood pressure was elevated today.  This may 

be because you were anxious, in pain or because you need medication.  It is 

important to follow up with your primary care provider for full evaluation.


Forms:  Elevated Blood Pressure, Return to Work

## 2019-06-25 ENCOUNTER — HOSPITAL ENCOUNTER (EMERGENCY)
Dept: HOSPITAL 62 - ER | Age: 34
Discharge: HOME | End: 2019-06-25
Payer: COMMERCIAL

## 2019-06-25 VITALS — SYSTOLIC BLOOD PRESSURE: 140 MMHG | DIASTOLIC BLOOD PRESSURE: 79 MMHG

## 2019-06-25 DIAGNOSIS — M54.42: Primary | ICD-10-CM

## 2019-06-25 PROCEDURE — 99283 EMERGENCY DEPT VISIT LOW MDM: CPT

## 2019-06-25 PROCEDURE — 96372 THER/PROPH/DIAG INJ SC/IM: CPT

## 2019-06-25 NOTE — ER DOCUMENT REPORT
ED Neck/Back Problem





- General


Chief Complaint: Back Pain


Stated Complaint: BACK PAIN


Time Seen by Provider: 06/25/19 19:33


Mode of Arrival: Ambulatory


Information source: Patient


Notes: 





34-year-old female presents to ED for complaint of lower back pain mostly on the

left shoot down the left leg.  She states it started Sunday.  She states she had

problems with her health since she moved out of the house suddenly on Sunday.  

She states that first there was mold in house then the air conditioner broke.  

She states she has had multiple back surgeries in the past and after moving in 2

days her pain is much worse.  Patient states she called her back specialist they

were unable to see her today said it would be at least next week or more.  

Patient is alert oriented respirations regular and unlabored speaking in full 

sentences walks with even steady gait.  She denies any saddle anesthesia, loss 

of control of bowel bladder, loss of sensation to her control of the lower 

extremities.


TRAVEL OUTSIDE OF THE [place] IN LAST 30 DAYS: No





- HPI


Patient complains to provider of: Pain, Lower back


Onset: Other - Chronic worse since Sunday when she moved


Onset: Chronic


Timing: Worse


Quality of pain: Sharp, Throbbing


Severity: Moderate


Pain Level: 4


Recent injury: No


Associated symptoms: Like prior neck/back pain - Left leg, Numbness/tingling, 

Radiation to leg, Lower back pain.  denies: Constipation, Fever, Incontinence, 

Motor loss, Radiation to arm, Radiation to chest, Sensory loss - Left, Sweaty, 

Unable to urinate


Exacerbated by: Sitting position


Relieved by: Nothing


Similar symptoms previously: Yes


Recently seen / treated by doctor: No





- Related Data


Allergies/Adverse Reactions: 


                                        





No Known Allergies Allergy (Verified 06/25/19 18:19)


   











Past Medical History





- General


Information source: Patient





- Social History


Smoking Status: Former Smoker


Cigarette use (# per day): No


Chew tobacco use (# tins/day): No


Smoking Education Provided: No


Frequency of alcohol use: Occasional


Drug Abuse: None


Lives with: Family


Family History: Malignancy - Skin and breast cancer


Patient has suicidal ideation: No


Patient has homicidal ideation: No





- Past Medical History


Cardiac Medical History: Reports: None


Pulmonary Medical History: Reports: Hx Pneumonia


EENT Medical History: Reports: None


Neurological Medical History: Reports: None


Endocrine Medical History: Reports: None


Renal/ Medical History: Reports: None


Malignancy Medical History: Reports: None


GI Medical History: Reports: None


Musculoskeletal Medical History: Reports Hx Arthritis - back, Reports Hx 

Musculoskeletal Deformity - Herniated disc, Reports Hx Musculoskeletal Trauma


Skin Medical History: Reports None


Psychiatric Medical History: Reports: Hx Depression - Patient took herself off 

of her medication


Traumatic Medical History: Reports: None


Infectious Medical History: Reports: None


Past Surgical History: Reports: Hx Oral Surgery - [place] Teeth, Hx Orthopedic 

Surgery - ACL repair R knee, L ankle, 2 back





- Immunizations


Immunizations up to date: Yes


Hx Diphtheria, Pertussis, Tetanus Vaccination: Yes





Review of Systems





- Review of Systems


Constitutional: No symptoms reported


EENT: No symptoms reported


Cardiovascular: No symptoms reported


Respiratory: No symptoms reported


Gastrointestinal: No symptoms reported


Genitourinary: No symptoms reported


Female Genitourinary: No symptoms reported


Musculoskeletal: No symptoms reported, Back pain, Muscle pain, Muscle stiffness


Skin: No symptoms reported


Hematologic/Lymphatic: No symptoms reported


Neurological/Psychological: No symptoms reported


-: Yes All other systems reviewed and negative





Physical Exam





- Vital signs


Vitals: 


                                        











Temp Pulse Resp BP Pulse Ox


 


 98.4 F   70   18   136/81 H  99 


 


 06/25/19 18:22  06/25/19 18:22  06/25/19 18:22  06/25/19 18:22  06/25/19 18:22











Interpretation: Normal





- General


General appearance: Appears well, Alert





- HEENT


Head: Normocephalic, Atraumatic


Eyes: Normal


Pupils: PERRL





- Respiratory


Respiratory status: No respiratory distress


Chest status: Nontender


Breath sounds: Normal


Chest palpation: Normal





- Cardiovascular


Rhythm: Regular


Heart sounds: Normal auscultation


Murmur: No





- Abdominal


Inspection: Normal


Distension: No distension


Bowel sounds: Normal


Tenderness: Nontender


Organomegaly: No organomegaly





- Back


Back: Normal, Tender, Vertebra tenderness - No signs or symptoms of cauda 

equina, no loss of sensation to the lower extremities, no loss control to lower 

extremities, no saddle anesthesia, no loss of control of bowel bladder..  No: 

Deformity/step-off, CVA tenderness, Scars, Scoliosis, Wounds





- Extremities


General upper extremity: Normal inspection, Nontender, Normal color, Normal ROM,

Normal temperature


General lower extremity: Normal inspection, Nontender, Normal color, Normal ROM,

Normal temperature, Normal weight bearing.  No: Jane's sign





- Neurological


Neuro grossly intact: Yes


Cognition: Normal


Orientation: AAOx4


[place] Coma Scale Eye Opening: Spontaneous


[place] Coma Scale Verbal: Oriented


[place] Coma Scale Motor: Obeys Commands


[place] Coma Scale Total: 15


Speech: Normal


Cranial nerves: Normal


Cerebellar coordination: Normal


Motor strength normal: LUE, RUE, LLE, RLE


Additional motor exam normals: Equal 


Babinski reflex: Normal (flexor plantar)


Sensory: Normal


Biceps - Reflex grade: 2 = Normal


Triceps - Reflex grade: 2 = Normal


Brachioradialis - Reflex grade: 2 = Normal


Knee - Reflex grade: 2 = Normal


Ankle - Reflex grade: 2 = Normal





- Psychological


Associated symptoms: Normal affect, Normal mood





- Skin


Skin Temperature: Warm


Skin Moisture: Dry


Skin Color: Normal





Course





- Re-evaluation


Re-evalutation: 





06/25/19 20:13


She was treated with Toradol 60 mg IM, Decadron 10 mg IM, and Lidoderm patch in 

the emergency room and discharged home with prescription for Robaxin.  Patient 

was instructed to follow-up with her back specialist in [place] as soon as 

possible.  Patient was also given instructions on using lidocaine cream or 

over-the-counter lidocaine patches after this patch is completed.  Patient 

verbalized understanding and agreement with treatment plan.  After performing a 

Medical Screening Examination, I estimate there is LOW risk for EXPANDING OR 

RUPTURED ABDOMINAL AORTIC ANEURYSM, CAUDA EQUINA SYNDROME, EPIDURAL MASS LESION,

or HERNIATED DISK CAUSING SEVERE SPINAL STENOSIS, thus I consider the discharge 

disposition reasonable.  I have reevaluated this patient multiple times and no 

significant life threatening changes are noted. The patient  and I have 

discussed the diagnosis and risks, and we agree with discharging home and close 

follow-up. We also discussed returning to the Emergency Department immediately 

if new or worsening symptoms occur with the understanding that symptoms and 

presentations can change. We have discussed the symptoms which are most 

concerning (e.g., saddle anesthesia, urinary or bowel incontinence or retention,

changing or worsening pain) that necessitate immediate return.





- Vital Signs


Vital signs: 


                                        











Temp Pulse Resp BP Pulse Ox


 


 98.2 F   54 L  15   140/79 H  98 


 


 06/25/19 20:30  06/25/19 20:30  06/25/19 20:30  06/25/19 20:30  06/25/19 20:30














Discharge





- Discharge


Clinical Impression: 


Low back pain


Qualifiers:


 Chronicity: chronic Back pain laterality: left Sciatica presence: with sciatica

Sciatica laterality: sciatica of left side Qualified Code(s): M54.42 - Lumbago 

with sciatica, left side





Condition: Stable


Disposition: HOME, SELF-CARE


Instructions:  Family Physicians / Practices


Additional Instructions: 


Chronic Back Pain





     Chronic back pain (pain persisting longer than three months) is a common 

problem. A medical evaluation can look for herniated disc, arthritis, 

osteoporosis, tumors, and infections. But at least half the time, there's no 

obvious treatable cause. Anxiety and depression tend to worsen back pain.


     Ibuprofen or other anti-inflammatory medicine can help. A heating pad, used

for 15-20 minutes at a time, can ease pain. For this type of back pain, narcotic

medicines should be avoided. Muscle relaxers are rarely helpful unless you're 

having spasms.


     Activity is important. Find an aerobic exercise program that your back can 

tolerate. Too much rest makes back pain worse. Specific back exercises are 

usually prescribed to strengthen the back and abdominal muscles. Often, a 

physical therapist can help. Avoid heavy lifting, working while bent over, or 

standing with both knees straight.


     Most back pain patients do better with a firm mattress.


     If new symptoms of a "herniated disc" (radiation of pain, numbness, or 

tingling down the back of the leg or weakness in the leg) occur, you should be 

re-examined.








MUSCLE RELAXERS: 


     Muscle relaxing medications are usually prescribed for acute muscle spasm 

or injury to the neck and back.  They are often combined with antiinflammatory 

pain medication for increased relief.


     You may stop the muscle relaxer when the pain and stiffness have improved. 

Start the medication again if spasms recur.  


     Muscle relaxers may cause drowsiness, especially with the first dose.  Do 

not operate machinery or drive while under the effects of the medication.  Most 

muscle relaxers last up to 24 hours.  Do not combine the medication with 

alcohol.








ICE PACKS:


     Apply ice packs frequently against the painful area.  Many different 

schedules are recommended, such as "20 minutes on, 20 minutes off" or "one hour 

ice, two hours rest."  If you need to work, you may need to go longer between 

ice treatments.  You should plan to have the area ice packed AT LEAST one fourth

of the time.


     The ice should be applied over the wrap, tape, or splint, or over a layer 

of cloth -- not directly against the skin.  Some ice bags have a built-in cloth 

and can be put directly on the skin.





WARM PACKS:


     After approximately two days, apply gentle heat (such as a heating pad or 

hot water bottle) for about 20 to 30 minutes about every two hours -- at least 

four times daily.  Warmth and elevation will help you make a more rapid 

recovery, and will ease the pain considerably.


     Do not use HOT heat, and never apply heat for longer than 30 minutes.  The 

continuous heat can invisibly damage skin and muscles -- even when no burn is 

seen on the surface.  Damaged muscles can make you MORE sore.





Toradol Injection





     You have been given an injection of ketorolac tromethamine (Toradol).  This

is an excellent, safe drug for pain control.  It also has potent 

antiinflammatory action.  You should have significant pain relief within about 

one hour.


     Toradol is not addicting and is non-sedating.  It does not interfere with 

driving or work.


     Call or return if you develop itching, hives, shortness of breath, or rash.





STEROID MEDICATION:





     You have been given an injection of medicine of the cortisone/steroid 

class.  This medication is used to control inflammation or allergy.  It is often

continued as a pill for a short period of time, until the acute process subsides

.


     There are usually no side effects from short-term use of cortisone-like 

medications.  Some persons feel an increased sense of well-being and are not 

sleepy at bedtime.  Long-term use of cortisone medications is best avoided, 

unless required for a severe condition.  If your condition does not remit, or 

relapses after the course of corticosteroid medication, you should consult your 

physician.





Stretching Exercises for the Back





     The physician has recommended that you begin stretching exercises for your 

back.  These are often used even while the back is painful. However, you should 

notify the physician if the activities seem to increase your pain.


     PELVIC TILT:  Lie flat on your back with knees bent.  Tighten your stomach 

and buttock muscles so it flattens your lower back against the floor.  Hold 10 

seconds.  Repeat 10 times, twice daily.


     KNEE RAISE:  Lying on the back with knees bent, raise one knee to your 

chest, then the other.  Hold both knees against the chest 10 seconds, then lower

one knee at a time.  Repeat 10 times, twice daily.


     PARTIAL TRUNK RAISE:  Lie face down, arms at your sides.  Keeping your 

waist on the floor, use your arms raise your chest up.  Support yourself on your

elbows for 30 seconds.  Repeat twice daily, increasing the time to two minutes 

as you recover.





A Lidoderm patch was applied to your low back in the area you stated was 

hurting.  This needs to be removed in 12 hours.  Then you can use an 

over-the-counter patch or Aspercreme lidocaine cream to the area.  Please leave 

all medicines off for 12 hours before reapplying.  So you can have the lidocaine

on for 12 hours off for 12 hours on for 12 hours.





FOLLOW-UP CARE:


If you have been referred to a physician for follow-up care, [facility] for an appointment as you were instructed or within the next 

two days.  If you experience worsening or a significant change in your symptoms,

notify the physician immediately or return to the Emergency Department at any 

time for re-evaluation.


Prescriptions: 


Ibuprofen [Motrin 600 mg Tablet] 600 mg PO Q8HP PRN #20 tablet


 PRN Reason: 


Methocarbamol [Robaxin 500 mg Tablet] 500 mg PO TIDP PRN #20 tablet


 PRN Reason: 


Forms:  Elevated Blood Pressure, Return to Work

## 2019-12-09 ENCOUNTER — HOSPITAL ENCOUNTER (EMERGENCY)
Dept: HOSPITAL 62 - ER | Age: 34
Discharge: HOME | End: 2019-12-09
Payer: COMMERCIAL

## 2019-12-09 VITALS — DIASTOLIC BLOOD PRESSURE: 50 MMHG | SYSTOLIC BLOOD PRESSURE: 118 MMHG

## 2019-12-09 DIAGNOSIS — R10.31: ICD-10-CM

## 2019-12-09 DIAGNOSIS — N39.0: ICD-10-CM

## 2019-12-09 DIAGNOSIS — M54.5: ICD-10-CM

## 2019-12-09 DIAGNOSIS — R10.9: ICD-10-CM

## 2019-12-09 DIAGNOSIS — A59.00: Primary | ICD-10-CM

## 2019-12-09 DIAGNOSIS — R10.819: ICD-10-CM

## 2019-12-09 DIAGNOSIS — G89.29: ICD-10-CM

## 2019-12-09 LAB
ADD MANUAL DIFF: NO
ADD MANUAL MICROSCOPIC: YES
ALBUMIN SERPL-MCNC: 4.1 G/DL (ref 3.5–5)
ALP SERPL-CCNC: 46 U/L (ref 38–126)
ANION GAP SERPL CALC-SCNC: 8 MMOL/L (ref 5–19)
APPEARANCE UR: (no result)
APTT PPP: YELLOW S
AST SERPL-CCNC: 15 U/L (ref 14–36)
BACTERIA #/AREA URNS HPF: (no result) /HPF
BASOPHILS # BLD AUTO: 0.1 10^3/UL (ref 0–0.2)
BASOPHILS NFR BLD AUTO: 0.8 % (ref 0–2)
BILIRUB DIRECT SERPL-MCNC: 0.1 MG/DL (ref 0–0.4)
BILIRUB SERPL-MCNC: 0.7 MG/DL (ref 0.2–1.3)
BILIRUB UR QL STRIP: NEGATIVE
BUN SERPL-MCNC: 11 MG/DL (ref 7–20)
CALCIUM: 9.7 MG/DL (ref 8.4–10.2)
CHLORIDE SERPL-SCNC: 105 MMOL/L (ref 98–107)
CO2 SERPL-SCNC: 26 MMOL/L (ref 22–30)
EOSINOPHIL # BLD AUTO: 0.3 10^3/UL (ref 0–0.6)
EOSINOPHIL NFR BLD AUTO: 2.9 % (ref 0–6)
ERYTHROCYTE [DISTWIDTH] IN BLOOD BY AUTOMATED COUNT: 14 % (ref 11.5–14)
GLUCOSE SERPL-MCNC: 96 MG/DL (ref 75–110)
GLUCOSE UR STRIP-MCNC: NEGATIVE MG/DL
HCT VFR BLD CALC: 38.6 % (ref 36–47)
HGB BLD-MCNC: 13.1 G/DL (ref 12–15.5)
KETONES UR STRIP-MCNC: NEGATIVE MG/DL
LYMPHOCYTES # BLD AUTO: 2.1 10^3/UL (ref 0.5–4.7)
LYMPHOCYTES NFR BLD AUTO: 22.8 % (ref 13–45)
MCH RBC QN AUTO: 28.6 PG (ref 27–33.4)
MCHC RBC AUTO-ENTMCNC: 33.8 G/DL (ref 32–36)
MCV RBC AUTO: 85 FL (ref 80–97)
MONOCYTES # BLD AUTO: 0.5 10^3/UL (ref 0.1–1.4)
MONOCYTES NFR BLD AUTO: 5.6 % (ref 3–13)
NEUTROPHILS # BLD AUTO: 6.3 10^3/UL (ref 1.7–8.2)
NEUTS SEG NFR BLD AUTO: 67.9 % (ref 42–78)
NITRITE UR QL STRIP: NEGATIVE
PH UR STRIP: 7 [PH] (ref 5–9)
PLATELET # BLD: 456 10^3/UL (ref 150–450)
POTASSIUM SERPL-SCNC: 4.1 MMOL/L (ref 3.6–5)
PROT SERPL-MCNC: 7 G/DL (ref 6.3–8.2)
PROT UR STRIP-MCNC: NEGATIVE MG/DL
RBC # BLD AUTO: 4.57 10^6/UL (ref 3.72–5.28)
SP GR UR STRIP: 1.02
T VAGINALIS URNS QL MICRO: PRESENT
TOTAL CELLS COUNTED % (AUTO): 100 %
UROBILINOGEN UR-MCNC: NEGATIVE MG/DL (ref ?–2)
WBC # BLD AUTO: 9.3 10^3/UL (ref 4–10.5)
WBC #/AREA URNS HPF: (no result) /HPF

## 2019-12-09 PROCEDURE — 81001 URINALYSIS AUTO W/SCOPE: CPT

## 2019-12-09 PROCEDURE — 80053 COMPREHEN METABOLIC PANEL: CPT

## 2019-12-09 PROCEDURE — 96372 THER/PROPH/DIAG INJ SC/IM: CPT

## 2019-12-09 PROCEDURE — 84703 CHORIONIC GONADOTROPIN ASSAY: CPT

## 2019-12-09 PROCEDURE — 36415 COLL VENOUS BLD VENIPUNCTURE: CPT

## 2019-12-09 PROCEDURE — 85025 COMPLETE CBC W/AUTO DIFF WBC: CPT

## 2019-12-09 PROCEDURE — 93976 VASCULAR STUDY: CPT

## 2019-12-09 PROCEDURE — 76830 TRANSVAGINAL US NON-OB: CPT

## 2019-12-09 PROCEDURE — 99284 EMERGENCY DEPT VISIT MOD MDM: CPT

## 2019-12-09 NOTE — RADIOLOGY REPORT (SQ)
EXAM DESCRIPTION:  U/S NON OB PEL TV W/DOPPLER



COMPLETED DATE/TIME:  12/9/2019 2:04 pm



REASON FOR STUDY:  R flank, RLQ pain



COMPARISON:  None.



TECHNIQUE:  Dynamic and static grayscale images acquired of the pelvis via transvaginal approach and 
recorded on PACS. Additional selected color Doppler and spectral images recorded.



LIMITATIONS:  None.



FINDINGS:  UTERUS: Contour normal.  IUD.  No mass.

ENDOMETRIAL STRIPE: No focal or generalized thickening. No masses.

CERVIX: No nabothian cysts.

RIGHT OVARY AND DOPPLER: Normal size. No worrisome masses. Normal arterial vascular flow without evid
ence for torsion.

LEFT OVARY AND DOPPLER: Normal size. No worrisome masses. Normal arterial vascular flow without evide
nce for torsion.

FREE FLUID: None noted.

OTHER: No other significant finding.



IMPRESSION:  NORMAL TRANSVAGINAL PELVIC ULTRASOUND.



TECHNICAL DOCUMENTATION:  JOB ID:  [id number]

 2011 [employer]- All Rights Reserved                          Rev-[id number]



Reading location - IP/workstation name: [email or web address]

## 2019-12-09 NOTE — ER DOCUMENT REPORT
ED Medical Screen (RME)





- General


Chief Complaint: Abdominal Pain


Stated Complaint: LOW BACK PAIN, ABDOMINAL PAIN


Time Seen by Provider: 12/09/19 11:38


Mode of Arrival: Ambulatory


Information source: Patient


Notes: 





Patient presents complaining of a flareup of her chronic back pain.  Patient 

states she does have pain to the right lower back area that radiates to the 

right lower quadrant of her abdomen.  Patient states that her chronic back pain 

does not typically cause abdominal tenderness.  Patient denies any fever, 

urinary symptoms, nausea vomiting or diarrhea.





I have greeted and performed a rapid initial assessment of this patient.  A 

comprehensive ED assessment and evaluation of the patient, analysis of test 

results and completion of the medical decision making process will be conducted 

by additional ED providers.


TRAVEL OUTSIDE OF THE [place] IN LAST 30 DAYS: No





- Related Data


Allergies/Adverse Reactions: 


                                        





No Known Allergies Allergy (Verified 12/09/19 11:36)


   











Past Medical History





- Social History


Chew tobacco use (# tins/day): No


Frequency of alcohol use: None


Drug Abuse: None





- Past Medical History


Cardiac Medical History: 


   Denies: Hx Coronary Artery Disease, Hx Heart Attack, Hx Hypertension


Pulmonary Medical History: Reports: Hx Pneumonia


   Denies: Hx Asthma, Hx Bronchitis, Hx COPD


Neurological Medical History: Denies: Hx Cerebrovascular Accident, Hx Seizures


Renal/ Medical History: Denies: Hx Peritoneal Dialysis


Musculoskeltal Medical History: Reports Hx Arthritis - back, Reports Hx 

Musculoskeletal Deformity - Herniated disc, Reports Hx Musculoskeletal Trauma


Psychiatric Medical History: Reports: Hx Depression - Patient took herself off 

of her medication


Past Surgical History: Reports: Hx Oral Surgery - [place] Teeth, Hx Orthopedic 

Surgery - ACL repair R knee, L ankle, 2 back





- Immunizations


Immunizations up to date: Yes


Hx Diphtheria, Pertussis, Tetanus Vaccination: Yes





Physical Exam





- Vital signs


Vitals: 





                                        











Temp Pulse Resp BP Pulse Ox


 


 98.2 F   78   18   141/64 H  100 


 


 12/09/19 11:36  12/09/19 11:36  12/09/19 11:36  12/09/19 11:36  12/09/19 11:36














- General


General appearance: Appears well, Alert


Notes: 





Right lower back, right lower quadrant tenderness





Course





- Vital Signs


Vital signs: 





                                        











Temp Pulse Resp BP Pulse Ox


 


 98.2 F   78   18   141/64 H  100 


 


 12/09/19 11:36  12/09/19 11:36  12/09/19 11:36  12/09/19 11:36  12/09/19 11:36

## 2019-12-09 NOTE — ER DOCUMENT REPORT
ED GI/





- General


Chief Complaint: Abdominal Pain


Stated Complaint: LOW BACK PAIN, ABDOMINAL PAIN


Time Seen by Provider: 12/09/19 11:38


Primary Care Provider: 


FRANTZ CHICAS FNP-C [Primary Care Provider] - Follow up as needed


Mode of Arrival: Ambulatory


Information source: Patient


Notes: 





Patient presents complaining of a flareup of her chronic back pain.  Patient 

states she does have pain to the right lower back area that radiates to the 

right lower quadrant of her abdomen.  Patient states that her chronic back pain 

does not typically cause abdominal tenderness.  Patient denies any fever, 

urinary symptoms, nausea vomiting or diarrhea.





TRAVEL OUTSIDE OF THE [place] IN LAST 30 DAYS: No





- Related Data


Allergies/Adverse Reactions: 


                                        





No Known Allergies Allergy (Verified 12/09/19 11:36)


   











Past Medical History





- General


Information source: Patient





- Social History


Smoking Status: Never Smoker


Chew tobacco use (# tins/day): No


Frequency of alcohol use: None


Drug Abuse: None


Family History: Malignancy - Skin and breast cancer


Patient has suicidal ideation: No


Patient has homicidal ideation: No





- Past Medical History


Cardiac Medical History: 


   Denies: Hx Coronary Artery Disease, Hx Heart Attack, Hx Hypertension


Pulmonary Medical History: Reports: Hx Pneumonia


   Denies: Hx Asthma, Hx Bronchitis, Hx COPD


Neurological Medical History: Denies: Hx Cerebrovascular Accident, Hx Seizures


Renal/ Medical History: Denies: Hx Peritoneal Dialysis


Musculoskeletal Medical History: Reports Hx Arthritis - back, Reports Hx 

Musculoskeletal Deformity - Herniated disc, Reports Hx Musculoskeletal Trauma


Psychiatric Medical History: Reports: Hx Depression


Past Surgical History: Reports: Hx Oral Surgery - [place] Teeth, Hx Orthopedic 

Surgery - ACL repair R knee, L ankle, 2 back





- Immunizations


Immunizations up to date: Yes


Hx Diphtheria, Pertussis, Tetanus Vaccination: Yes





Review of Systems





- Review of Systems


Constitutional: No symptoms reported


EENT: No symptoms reported


Cardiovascular: No symptoms reported


Respiratory: No symptoms reported


Gastrointestinal: Abdominal pain


Genitourinary: Flank pain


Female Genitourinary: No symptoms reported


Musculoskeletal: Back pain


Skin: No symptoms reported


Hematologic/Lymphatic: No symptoms reported


Neurological/Psychological: No symptoms reported





Physical Exam





- Vital signs


Vitals: 


                                        











Temp Pulse Resp BP Pulse Ox


 


 98.2 F   78   18   141/64 H  100 


 


 12/09/19 11:36  12/09/19 11:36  12/09/19 11:36  12/09/19 11:36  12/09/19 11:36














- Notes


Notes: 





PHYSICAL EXAMINATION:





GENERAL: Well-appearing, well-nourished and in no acute distress.





HEAD: Atraumatic, normocephalic.





EYES: Pupils equal round and reactive to light, extraocular movements intact, 

conjunctiva are normal.





ENT: Nares patent, oropharynx clear without exudates.  Moist mucous membranes.





NECK: Normal range of motion, supple without lymphadenopathy





LUNGS: Breath sounds clear to auscultation bilaterally and equal.  No wheezes 

rales or rhonchi.





HEART: Regular rate and rhythm without murmurs





ABDOMEN: Soft, mildly tender low abd over the bladder, nondistended abdomen.  No

guarding, no rebound.  No masses appreciated.





Female : No CVA tenderness





Musculoskeletal: Normal range of motion, no pitting or edema.  No cyanosis.





NEUROLOGICAL: Cranial nerves grossly intact.  Normal speech, normal gait.  

Normal sensory, motor exams





PSYCH: Normal mood, normal affect.





SKIN: Warm, Dry, normal turgor, no rashes or lesions noted.





Course





- Re-evaluation


Re-evalutation: 











Laboratory











  12/09/19 12/09/19 12/09/19





  12:12 12:12 12:12


 


WBC  9.3  


 


RBC  4.57  


 


Hgb  13.1  


 


Hct  38.6  


 


MCV  85  


 


MCH  28.6  


 


MCHC  33.8  


 


RDW  14.0  


 


Plt Count  456 H  


 


Lymph % (Auto)  22.8  


 


Mono % (Auto)  5.6  


 


Eos % (Auto)  2.9  


 


Baso % (Auto)  0.8  


 


Absolute Neuts (auto)  6.3  


 


Absolute Lymphs (auto)  2.1  


 


Absolute Monos (auto)  0.5  


 


Absolute Eos (auto)  0.3  


 


Absolute Basos (auto)  0.1  


 


Seg Neutrophils %  67.9  


 


Sodium   139.2 


 


Potassium   4.1 


 


Chloride   105 


 


Carbon Dioxide   26 


 


Anion Gap   8 


 


BUN   11 


 


Creatinine   0.76 


 


Est GFR ( Amer)   > 60 


 


Est GFR (MDRD) Non-Af   > 60 


 


Glucose   96 


 


Calcium   9.7 


 


Total Bilirubin   0.7 


 


Direct Bilirubin   0.1 


 


Neonat Total Bilirubin   Not Reportable 


 


Neonat Direct Bilirubin   Not Reportable 


 


Neonat Indirect Bili   Not Reportable 


 


AST   15 


 


ALT   11 


 


Alkaline Phosphatase   46 


 


Total Protein   7.0 


 


Albumin   4.1 


 


Serum HCG, Qual    NEGATIVE


 


Urine Color   


 


Urine Appearance   


 


Urine pH   


 


Ur Specific Gravity   


 


Urine Protein   


 


Urine Glucose (UA)   


 


Urine Ketones   


 


Urine Blood   


 


Urine Nitrite   


 


Urine Bilirubin   


 


Urine Urobilinogen   


 


Ur Leukocyte Esterase   


 


Urine WBC   


 


Ur Squamous Epith Cells   


 


Urine Bacteria   


 


Urine Trichomonas   


 


Urine Ascorbic Acid   


 


Chlamydia DNA (PCR)   


 


N.gonorrhoeae DNA (PCR)   














  12/09/19 12/09/19





  12:12 15:59


 


WBC  


 


RBC  


 


Hgb  


 


Hct  


 


MCV  


 


MCH  


 


MCHC  


 


RDW  


 


Plt Count  


 


Lymph % (Auto)  


 


Mono % (Auto)  


 


Eos % (Auto)  


 


Baso % (Auto)  


 


Absolute Neuts (auto)  


 


Absolute Lymphs (auto)  


 


Absolute Monos (auto)  


 


Absolute Eos (auto)  


 


Absolute Basos (auto)  


 


Seg Neutrophils %  


 


Sodium  


 


Potassium  


 


Chloride  


 


Carbon Dioxide  


 


Anion Gap  


 


BUN  


 


Creatinine  


 


Est GFR ( Amer)  


 


Est GFR (MDRD) Non-Af  


 


Glucose  


 


Calcium  


 


Total Bilirubin  


 


Direct Bilirubin  


 


Neonat Total Bilirubin  


 


Neonat Direct Bilirubin  


 


Neonat Indirect Bili  


 


AST  


 


ALT  


 


Alkaline Phosphatase  


 


Total Protein  


 


Albumin  


 


Serum HCG, Qual  


 


Urine Color  YELLOW 


 


Urine Appearance  SLIGHTLY HAZY 


 


Urine pH  7.0 


 


Ur Specific Gravity  1.019 


 


Urine Protein  NEGATIVE 


 


Urine Glucose (UA)  NEGATIVE 


 


Urine Ketones  NEGATIVE 


 


Urine Blood  NEGATIVE 


 


Urine Nitrite  NEGATIVE 


 


Urine Bilirubin  NEGATIVE 


 


Urine Urobilinogen  NEGATIVE 


 


Ur Leukocyte Esterase  LARGE H 


 


Urine WBC  5-10 


 


Ur Squamous Epith Cells  MANY 


 


Urine Bacteria  TRACE 


 


Urine Trichomonas  PRESENT 


 


Urine Ascorbic Acid  NEGATIVE 


 


Chlamydia DNA (PCR)   Cancelled


 


N.gonorrhoeae DNA (PCR)   Cancelled








CBC and CMP are unremarkable.  Patient has mild suprapubic tenderness but 

otherwise abdomen is benign.  Patient does have urinary tract infection 

evidenced on the urinalysis as well as positive trichomonas.  Patient will be 

treated prophylactically for trichomonas, chlamydia and gonorrhea.  She will 

also be started on antibiotics for urinary tract infection.  Patient has been 

afebrile and has not had any nausea or vomiting.  No indication for further 

work-up.





The patient's emergency department workup and current diagnosis were explained 

to the patient and or family.  Follow-up instructions were provided.  

Medications if prescribed were discussed. Instructions for when to return to the

emergency department including specific worrisome symptoms were discussed with 

the patient and/or family.








- Vital Signs


Vital signs: 


                                        











Temp Pulse Resp BP Pulse Ox


 


 97.9 F   79   16   118/50 L  100 


 


 12/09/19 15:15  12/09/19 15:15  12/09/19 15:15  12/09/19 15:15  12/09/19 15:15














- Laboratory


Result Diagrams: 


                                 12/09/19 12:12





                                 12/09/19 12:12


Laboratory results interpreted by me: 


                                        











  12/09/19 12/09/19





  12:12 12:12


 


Plt Count  456 H 


 


Ur Leukocyte Esterase   LARGE H














Discharge





- Discharge


Clinical Impression: 


  infection, trichomonal





UTI (urinary tract infection)


Qualifiers:


 Urinary tract infection type: site unspecified Hematuria presence: without 

hematuria Qualified Code(s): N39.0 - Urinary tract infection, site not specified





Condition: Stable


Disposition: HOME, SELF-CARE


Additional Instructions: 


Your urine shows findings consistent with a urinary tract infection as well as 

gonorrhea.  Please take all the antibiotics as directed even if your symptoms 

have improved.  Please follow-up with your primary care physician as needed.  

Return to emergency room if you develop fever >101F, persistent vomiting, become

lethargic, have severe pain in your sides, or any other symptoms that are 

concerning to you.





Prescriptions: 


Ketorolac Tromethamine [Toradol 10 mg Tablet] 10 mg PO Q6HP PRN #20 tablet


 PRN Reason: 


Fluconazole [Diflucan] 150 mg PO ONCE PRN #1 tablet


 PRN Reason: 


Cephalexin [Keflex] 500 mg PO BID #14 capsule


Promethazine HCl [Phenergan 25 mg Tablet] 1 - 2 tab PO Q6H PRN #15 tablet


 PRN Reason: 


Forms:  Return to Work


Referrals: 


FRANTZ CHICAS FNP-C [Primary Care Provider] - Follow up as needed

## 2020-01-03 ENCOUNTER — HOSPITAL ENCOUNTER (EMERGENCY)
Dept: HOSPITAL 62 - ER | Age: 35
LOS: 1 days | Discharge: HOME | End: 2020-01-04
Payer: COMMERCIAL

## 2020-01-03 DIAGNOSIS — R51: ICD-10-CM

## 2020-01-03 DIAGNOSIS — S06.0X0A: Primary | ICD-10-CM

## 2020-01-03 DIAGNOSIS — Z79.899: ICD-10-CM

## 2020-01-03 DIAGNOSIS — M54.2: ICD-10-CM

## 2020-01-03 DIAGNOSIS — R22.0: ICD-10-CM

## 2020-01-03 DIAGNOSIS — G89.29: ICD-10-CM

## 2020-01-03 DIAGNOSIS — R11.0: ICD-10-CM

## 2020-01-03 DIAGNOSIS — S00.83XA: ICD-10-CM

## 2020-01-03 DIAGNOSIS — F17.200: ICD-10-CM

## 2020-01-03 DIAGNOSIS — M54.5: ICD-10-CM
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
## 2020-01-03 PROCEDURE — 72125 CT NECK SPINE W/O DYE: CPT

## 2020-01-03 PROCEDURE — 70486 CT MAXILLOFACIAL W/O DYE: CPT

## 2020-01-03 PROCEDURE — 99284 EMERGENCY DEPT VISIT MOD MDM: CPT

## 2020-01-03 PROCEDURE — 70450 CT HEAD/BRAIN W/O DYE: CPT

## 2020-01-03 PROCEDURE — L0120 CERV FLEX N/ADJ FOAM PRE OTS: HCPCS

## 2020-01-03 PROCEDURE — 96372 THER/PROPH/DIAG INJ SC/IM: CPT
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
## 2020-01-04 ENCOUNTER — HOSPITAL ENCOUNTER (EMERGENCY)
Dept: HOSPITAL 62 - ER | Age: 35
LOS: 1 days | Discharge: HOME | End: 2020-01-05
Payer: COMMERCIAL

## 2020-01-04 VITALS — DIASTOLIC BLOOD PRESSURE: 62 MMHG | SYSTOLIC BLOOD PRESSURE: 120 MMHG

## 2020-01-04 DIAGNOSIS — F17.200: ICD-10-CM

## 2020-01-04 DIAGNOSIS — R04.0: ICD-10-CM
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
## 2020-01-04 DIAGNOSIS — R51: ICD-10-CM

## 2020-01-04 DIAGNOSIS — R09.81: ICD-10-CM

## 2020-01-04 DIAGNOSIS — S00.33XA: Primary | ICD-10-CM

## 2020-01-04 PROCEDURE — 99283 EMERGENCY DEPT VISIT LOW MDM: CPT
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
## 2020-01-05 VITALS — DIASTOLIC BLOOD PRESSURE: 73 MMHG | SYSTOLIC BLOOD PRESSURE: 167 MMHG
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
